# Patient Record
Sex: MALE | Race: WHITE | Employment: OTHER | URBAN - METROPOLITAN AREA
[De-identification: names, ages, dates, MRNs, and addresses within clinical notes are randomized per-mention and may not be internally consistent; named-entity substitution may affect disease eponyms.]

---

## 2017-01-05 ENCOUNTER — OFFICE VISIT (OUTPATIENT)
Dept: CARDIOLOGY CLINIC | Age: 62
End: 2017-01-05

## 2017-01-05 DIAGNOSIS — I48.0 PAROXYSMAL ATRIAL FIBRILLATION (HCC): Primary | Chronic | ICD-10-CM

## 2017-02-09 ENCOUNTER — OFFICE VISIT (OUTPATIENT)
Dept: CARDIOLOGY CLINIC | Age: 62
End: 2017-02-09

## 2017-02-09 DIAGNOSIS — I48.0 PAROXYSMAL ATRIAL FIBRILLATION (HCC): Primary | Chronic | ICD-10-CM

## 2017-03-07 ENCOUNTER — OFFICE VISIT (OUTPATIENT)
Dept: CARDIOLOGY CLINIC | Age: 62
End: 2017-03-07

## 2017-03-07 ENCOUNTER — CLINICAL SUPPORT (OUTPATIENT)
Dept: CARDIOLOGY CLINIC | Age: 62
End: 2017-03-07

## 2017-03-07 VITALS
HEIGHT: 75 IN | OXYGEN SATURATION: 97 % | HEART RATE: 53 BPM | WEIGHT: 228 LBS | DIASTOLIC BLOOD PRESSURE: 90 MMHG | BODY MASS INDEX: 28.35 KG/M2 | RESPIRATION RATE: 16 BRPM | SYSTOLIC BLOOD PRESSURE: 138 MMHG

## 2017-03-07 DIAGNOSIS — I48.0 PAROXYSMAL ATRIAL FIBRILLATION (HCC): Primary | Chronic | ICD-10-CM

## 2017-03-07 DIAGNOSIS — E78.2 MIXED HYPERLIPIDEMIA: ICD-10-CM

## 2017-03-07 DIAGNOSIS — Z86.79 S/P ABLATION OF ATRIAL FIBRILLATION: Primary | ICD-10-CM

## 2017-03-07 DIAGNOSIS — Z98.890 S/P ABLATION OF ATRIAL FIBRILLATION: Primary | ICD-10-CM

## 2017-03-07 DIAGNOSIS — I49.5 SSS (SICK SINUS SYNDROME) (HCC): ICD-10-CM

## 2017-03-07 DIAGNOSIS — I10 ESSENTIAL HYPERTENSION: ICD-10-CM

## 2017-03-07 DIAGNOSIS — I48.3 TYPICAL ATRIAL FLUTTER (HCC): Chronic | ICD-10-CM

## 2017-03-07 DIAGNOSIS — I48.92 ATRIAL FLUTTER WITH RAPID VENTRICULAR RESPONSE (HCC): ICD-10-CM

## 2017-03-07 DIAGNOSIS — Z45.09 ENCOUNTER FOR LOOP RECORDER CHECK: ICD-10-CM

## 2017-03-07 RX ORDER — ASPIRIN 81 MG/1
TABLET ORAL DAILY
COMMUNITY

## 2017-03-07 RX ORDER — FLECAINIDE ACETATE 50 MG/1
50 TABLET ORAL 2 TIMES DAILY
Qty: 60 TAB | Refills: 5 | Status: SHIPPED | OUTPATIENT
Start: 2017-03-07 | End: 2017-04-06

## 2017-03-07 NOTE — MR AVS SNAPSHOT
Visit Information Date & Time Provider Department Dept. Phone Encounter #  
 3/7/2017  1:15 PM Víctor Todd Dose, [address] [facility] [phone] [id number] Follow-up Instructions Return in about 6 months (around 9/7/2017). Routing History Follow-up and Disposition History Your Appointments 9/5/2017  1:00 PM  
6 MONTH with Drea Orourke MD  
[facility] [address] [place] [address]) Appt Note: Per Dr Tiffani Larkin $CP REM  
 [address]  
[phone] [address]  
  
    
  
 3/16/2017  8:00 AM  
REMOTE OFFICE VISIT with [facility] [address] [place] [address]) Appt Note: REMOTE MDT ILR- NOT A CLINIC APT  
 [address]  
[phone] [address] Upcoming Health Maintenance Date Due Hepatitis C Screening 1955 FOBT Q 1 YEAR AGE 50-75 1/4/2004 ZOSTER VACCINE AGE 60> 1/4/2014 INFLUENZA AGE 9 TO ADULT 8/1/2016 DTaP/Tdap/Td series (2 - Td) 7/2/2022 Allergies as of 3/7/2017  Review Complete On: 3/7/2017 By: Drea Orourke MD  
 No Known Allergies Current Immunizations  Never Reviewed Name Date Influenza Vaccine PF 1/3/2015  2:35 PM  
 TDAP Vaccine 7/2/2012 11:30 AM  
  
 Not reviewed this visit You Were Diagnosed With   
  
 Codes Comments Paroxysmal atrial fibrillation (HCC)    -  Primary ICD-10-CM: I48.0 ICD-9-CM: 427.31 Mixed hyperlipidemia     ICD-10-CM: E78.2 ICD-9-CM: 272.2 Typical atrial flutter (HCC)     ICD-10-CM: I48.3 ICD-9-CM: 427.32   
 SSS (sick sinus syndrome) (HCC)     ICD-10-CM: I49.5 ICD-9-CM: 427.81 Essential hypertension     ICD-10-CM: I10 
ICD-9-CM: 401.9 Vitals BP Pulse Resp Height(growth percentile) Weight(growth percentile) SpO2 138/90 (BP 1 Location: Left arm, BP Patient Position: Sitting) (!) 53 16 6' 3\" (1.905 m) 228 lb (103.4 kg) 97% BMI Smoking Status 28.5 kg/m2 Never Smoker BMI and BSA Data Body Mass Index Body Surface Area 28.5 kg/m 2 2.34 m 2 Preferred Pharmacy Pharmacy Name Phone [facility] Lisa Jones [phone] Your Updated Medication List  
  
   
This list is accurate as of: 3/7/17  1:48 PM.  Always use your most recent med list.  
  
  
  
  
 allopurinol 300 mg tablet Commonly known as:  Gaylan Elders Take 300 mg by mouth daily. aspirin delayed-release 81 mg tablet Take  by mouth daily. atenolol 25 mg tablet Commonly known as:  TENORMIN Take 0.5 Tabs by mouth daily. atorvastatin 20 mg tablet Commonly known as:  LIPITOR Take 20 mg by mouth daily. flecainide 50 mg tablet Commonly known as:  TAMBOCOR Take 1 Tab by mouth two (2) times a day for 30 days. hydroCHLOROthiazide 25 mg tablet Commonly known as:  HYDRODIURIL Take 1 tablet by mouth daily. LEVITRA 20 mg tablet Generic drug:  vardenafil Take 20 mg by mouth as needed. potassium chloride 20 mEq tablet Commonly known as:  K-DUR, KLOR-CON Take 20 mEq by mouth two (2) times a day. quinapril 40 mg tablet Commonly known as:  ACCUPRIL Take 40 mg by mouth daily. VIAGRA 100 mg tablet Generic drug:  sildenafil citrate Take 100 mg by mouth as needed. zolpidem 10 mg tablet Commonly known as:  AMBIEN Take 10 mg by mouth nightly as needed. Prescriptions Sent to Pharmacy Refills  
 flecainide (TAMBOCOR) 50 mg tablet 5 Sig: Take 1 Tab by mouth two (2) times a day for 30 days. Class: Normal  
 Pharmacy: [address] [place] [address] Ravinder Rodriguez 8 Ph #: [phone] Route: Oral  
  
We Performed the Following AMB POC EKG ROUTINE W/ 12 LEADS, INTER & REP [36429 CPT(R)] Follow-up Instructions Return in about 6 months (around 9/7/2017). Introducing [facility]! Dear Yeni Burrell: 
Thank you for requesting a 99Bill account. Our records indicate that you already have an active 99Bill account. You can access your account anytime at [email or web address] Did you know that you can access your hospital and ER discharge instructions at any time in 99Bill? You can also review all of your test results from your hospital stay or ER visit. Additional Information If you have questions, please visit the Frequently Asked Questions section of the 99Bill website at [email or web address] Remember, 99Bill is NOT to be used for urgent needs. For medical emergencies, dial 911. Now available from your iPhone and Android! Please provide this summary of care documentation to your next provider. Your primary care clinician is listed as JAMES Foster . If you have any questions after today's visit, please call [phone].

## 2017-03-07 NOTE — PROGRESS NOTES
Subjective:      Lenin Dove is a 60 y.o. male is here for follow up s/p AF ablation in 5/2016. The patient denies chest pain/ shortness of breath, orthopnea, PND, LE edema, palpitations, syncope, presyncope or fatigue. Patient Active Problem List    Diagnosis Date Noted    S/P ablation of atrial fibrillation 05/02/2016    Paroxysmal atrial fibrillation ([address] 04/19/2016    SSS (sick sinus syndrome) ([address] 03/15/2016    Thrombocytopenia ([address] 01/05/2015    Atrial flutter ([address] 01/02/2015    Pneumonia 01/02/2015    Hypotension 01/02/2015    Hypertension 01/02/2015    Hyperlipidemia 01/02/2015    Atrial flutter with rapid ventricular response ([address] 01/02/2015      JAMES Vásquez MD  Past Medical History:   Diagnosis Date    Arrhythmia     atrial flutter/atrial fibrillation    Arthritis     gout    Hypertension     Ill-defined condition     hypercholesterolemia    S/P ablation of atrial fibrillation 5/2/2016      Past Surgical History:   Procedure Laterality Date    HX GI  1997    anal fissure repair     No Known Allergies   Family History   Problem Relation Age of Onset    Other Mother      liver cirrhosis    Diabetes Father     Heart Disease Brother     negative for cardiac disease  Social History     Social History    Marital status:      Spouse name: N/A    Number of children: N/A    Years of education: N/A     Social History Main Topics    Smoking status: Never Smoker    Smokeless tobacco: Never Used    Alcohol use 0.5 oz/week     1 Cans of beer per week      Comment: every month    Drug use: No    Sexual activity: Yes     Other Topics Concern    Not on file     Social History Narrative     Current Outpatient Prescriptions   Medication Sig    sildenafil citrate (VIAGRA) 100 mg tablet Take 100 mg by mouth as needed.  flecainide (TAMBOCOR) 100 mg tablet Take 1 Tab by mouth two (2) times a day.     atenolol (TENORMIN) 25 mg tablet Take 0.5 Tabs by mouth daily.    LEVITRA 20 mg tablet Take 20 mg by mouth as needed.  quinapril (ACCUPRIL) 40 mg tablet Take 20 mg by mouth two (2) times a day.  zolpidem (AMBIEN) 10 mg tablet Take 10 mg by mouth nightly as needed.  hydrochlorothiazide (HYDRODIURIL) 25 mg tablet Take 1 tablet by mouth daily.  allopurinol (ZYLOPRIM) 300 mg tablet Take 300 mg by mouth daily.  potassium chloride (K-DUR, KLOR-CON) 20 mEq tablet Take 20 mEq by mouth two (2) times a day.  atorvastatin (LIPITOR) 20 mg tablet Take 20 mg by mouth daily. No current facility-administered medications for this visit. There were no vitals filed for this visit. I have reviewed the nurses notes, vitals, problem list, allergy list, medical history, family, social history and medications. Review of Symptoms:    General: Pt denies excessive weight gain or loss. Pt is able to conduct ADL's  HEENT: Denies blurred vision, headaches, epistaxis and difficulty swallowing. Respiratory: Denies shortness of breath, GALEANO, wheezing or stridor. Cardiovascular: Denies precordial pain, palpitations, edema or PND  Gastrointestinal: Denies poor appetite, indigestion, abdominal pain or blood in stool  Urinary: Denies dysuria, pyuria  Musculoskeletal: Denies pain or swelling from muscles or joints  Neurologic: Denies tremor, paresthesias, or sensory motor disturbance  Skin: Denies rash, itching or texture change. Psych: Denies depression      Physical Exam:      General: Well developed, in no acute distress. HEENT: Eyes - PERRL, no jvd  Heart:  Normal S1/S2 negative S3 or S4. Regular, no murmur, gallop or rub.   Respiratory: Clear bilaterally x 4, no wheezing or rales  Abdomen:   Soft, non-tender, bowel sounds are active.   Extremities:  No edema, normal cap refill, no cyanosis. Musculoskeletal: No clubbing  Neuro: A&Ox3, speech clear, gait stable. Skin: Skin color is normal. No rashes or lesions.  Non diaphoretic  Vascular: 2+ pulses symmetric in all extremities    Cardiographics    Ekg: s yuly, first degree av block, IVCD    Results for orders placed or performed during the hospital encounter of 05/13/16   EKG, 12 LEAD, INITIAL   Result Value Ref Range    Ventricular Rate 90 BPM    Atrial Rate 90 BPM    P-R Interval 232 ms    QRS Duration 120 ms    Q-T Interval 376 ms    QTC Calculation (Bezet) 459 ms    Calculated P Axis 43 degrees    Calculated R Axis -54 degrees    Calculated T Axis 27 degrees    Diagnosis       Sinus rhythm with 1st degree AV block  Left axis deviation  Septal infarct (cited on or before 02-JAN-2015)  Inferior infarct , age undetermined  When compared with ECG of 05-JAN-2015 03:09,  Vent. rate has increased BY  37 BPM  QRS axis shifted left  Inferior infarct is now present    Confirmed by Ivan Caruso ([id number]) on 5/13/2016 8:47:27 AM           Lab Results   Component Value Date/Time    WBC 8.3 05/03/2016 03:42 AM    HGB 14.6 05/03/2016 03:42 AM    HCT 42.7 05/03/2016 03:42 AM    PLATELET 226 50/77/3538 03:42 AM    MCV 91.6 05/03/2016 03:42 AM      Lab Results   Component Value Date/Time    Sodium 138 05/03/2016 06:29 AM    Potassium 3.2 05/03/2016 06:29 AM    Chloride 103 05/03/2016 06:29 AM    CO2 31 05/03/2016 06:29 AM    Anion gap 4 05/03/2016 06:29 AM    Glucose 103 05/03/2016 06:29 AM    BUN 16 05/03/2016 06:29 AM    Creatinine 0.98 05/03/2016 06:29 AM    BUN/Creatinine ratio 16 05/03/2016 06:29 AM    GFR est AA >60 05/03/2016 06:29 AM    GFR est non-AA >60 05/03/2016 06:29 AM    Calcium 8.4 05/03/2016 06:29 AM    Bilirubin, total 0.8 04/25/2016 10:10 AM    AST (SGOT) 27 04/25/2016 10:10 AM    Alk. phosphatase 74 04/25/2016 10:10 AM    Protein, total 7.0 04/25/2016 10:10 AM    Albumin 4.5 04/25/2016 10:10 AM    Globulin 3.7 01/02/2015 05:40 AM    A-G Ratio 1.8 04/25/2016 10:10 AM    ALT (SGPT) 32 04/25/2016 10:10 AM         Assessment:     Assessment:        ICD-10-CM ICD-9-CM    1.  Mixed hyperlipidemia E78.2 272.2 AMB POC EKG ROUTINE W/ 12 LEADS, INTER & REP     Orders Placed This Encounter    AMB POC EKG ROUTINE W/ 12 LEADS, INTER & REP     Order Specific Question:   Reason for Exam:     Answer:   Routine        Plan:   Mr Phillip Du is here today for f/u regarding afib s/p ablation in May 2016 with subsequent atrial flutter s/p [facility]. ILR shows bradycardia in the 40s. He is a CHADs vasc score of 1. We will decrease his flecainide to 50 mg bid and he will follow up in 6 months. Continue medical management for HTN and hyperlipidemia. Thank you for allowing me to participate in Brooklyn Landaverde 's care.     Fred Cooper MD, Priscila Sun

## 2017-03-16 ENCOUNTER — OFFICE VISIT (OUTPATIENT)
Dept: CARDIOLOGY CLINIC | Age: 62
End: 2017-03-16

## 2017-03-16 DIAGNOSIS — I48.0 PAROXYSMAL ATRIAL FIBRILLATION (HCC): Primary | Chronic | ICD-10-CM

## 2017-04-17 ENCOUNTER — OFFICE VISIT (OUTPATIENT)
Dept: CARDIOLOGY CLINIC | Age: 62
End: 2017-04-17

## 2017-04-17 DIAGNOSIS — I48.0 PAROXYSMAL ATRIAL FIBRILLATION (HCC): Primary | Chronic | ICD-10-CM

## 2017-05-18 ENCOUNTER — OFFICE VISIT (OUTPATIENT)
Dept: CARDIOLOGY CLINIC | Age: 62
End: 2017-05-18

## 2017-05-18 DIAGNOSIS — I48.0 PAROXYSMAL ATRIAL FIBRILLATION (HCC): Primary | Chronic | ICD-10-CM

## 2017-06-20 ENCOUNTER — OFFICE VISIT (OUTPATIENT)
Dept: CARDIOLOGY CLINIC | Age: 62
End: 2017-06-20

## 2017-06-20 DIAGNOSIS — I48.0 PAROXYSMAL ATRIAL FIBRILLATION (HCC): Primary | Chronic | ICD-10-CM

## 2017-08-25 ENCOUNTER — OFFICE VISIT (OUTPATIENT)
Dept: CARDIOLOGY CLINIC | Age: 62
End: 2017-08-25

## 2017-08-25 DIAGNOSIS — I48.0 PAROXYSMAL ATRIAL FIBRILLATION (HCC): Primary | Chronic | ICD-10-CM

## 2017-08-25 DIAGNOSIS — I45.5 SINUS PAUSE: ICD-10-CM

## 2017-09-05 ENCOUNTER — OFFICE VISIT (OUTPATIENT)
Dept: CARDIOLOGY CLINIC | Age: 62
End: 2017-09-05

## 2017-09-05 VITALS
OXYGEN SATURATION: 98 % | DIASTOLIC BLOOD PRESSURE: 70 MMHG | WEIGHT: 220.4 LBS | RESPIRATION RATE: 18 BRPM | HEART RATE: 52 BPM | SYSTOLIC BLOOD PRESSURE: 118 MMHG | HEIGHT: 75 IN | BODY MASS INDEX: 27.4 KG/M2

## 2017-09-05 DIAGNOSIS — E78.2 MIXED HYPERLIPIDEMIA: ICD-10-CM

## 2017-09-05 DIAGNOSIS — Z86.79 S/P ABLATION OF ATRIAL FIBRILLATION: ICD-10-CM

## 2017-09-05 DIAGNOSIS — R00.1 BRADYCARDIA: ICD-10-CM

## 2017-09-05 DIAGNOSIS — I10 ESSENTIAL HYPERTENSION: ICD-10-CM

## 2017-09-05 DIAGNOSIS — I48.3 TYPICAL ATRIAL FLUTTER (HCC): Chronic | ICD-10-CM

## 2017-09-05 DIAGNOSIS — Z98.890 S/P ABLATION OF ATRIAL FIBRILLATION: ICD-10-CM

## 2017-09-05 DIAGNOSIS — I49.5 SSS (SICK SINUS SYNDROME) (HCC): ICD-10-CM

## 2017-09-05 DIAGNOSIS — I48.0 PAROXYSMAL ATRIAL FIBRILLATION (HCC): Primary | Chronic | ICD-10-CM

## 2017-09-05 RX ORDER — FLECAINIDE ACETATE 50 MG/1
TABLET ORAL 2 TIMES DAILY
COMMUNITY
End: 2017-09-05 | Stop reason: SDUPTHER

## 2017-09-05 RX ORDER — FLECAINIDE ACETATE 50 MG/1
50 TABLET ORAL 2 TIMES DAILY
Qty: 180 TAB | Refills: 3 | Status: SHIPPED | OUTPATIENT
Start: 2017-09-05 | End: 2018-03-06

## 2017-09-05 NOTE — PROGRESS NOTES
Subjective:      Mary Harper is a 60 y.o. male is here for f/u regarding afib s/p ablation and atrial flutter s/p [address] The patient denies chest pain/ shortness of breath, orthopnea, PND, LE edema, palpitations, syncope, presyncope or fatigue. Patient Active Problem List    Diagnosis Date Noted    S/P ablation of atrial fibrillation 05/02/2016    Paroxysmal atrial fibrillation ([address] 04/19/2016    SSS (sick sinus syndrome) ([address] 03/15/2016    Thrombocytopenia ([address] 01/05/2015    Atrial flutter ([address] 01/02/2015    Pneumonia 01/02/2015    Hypotension 01/02/2015    Hypertension 01/02/2015    Hyperlipidemia 01/02/2015    Atrial flutter with rapid ventricular response [facility] 01/02/2015      JAMES oHran MD  Past Medical History:   Diagnosis Date    Arrhythmia     atrial flutter/atrial fibrillation    Arthritis     gout    Hypertension     Ill-defined condition     hypercholesterolemia    S/P ablation of atrial fibrillation 5/2/2016      Past Surgical History:   Procedure Laterality Date    HX GI  1997    anal fissure repair     No Known Allergies   Family History   Problem Relation Age of Onset    Other Mother      liver cirrhosis    Diabetes Father     Heart Disease Brother     negative for cardiac disease  Social History     Social History    Marital status:      Spouse name: N/A    Number of children: N/A    Years of education: N/A     Social History Main Topics    Smoking status: Never Smoker    Smokeless tobacco: Never Used    Alcohol use 0.5 oz/week     1 Cans of beer per week      Comment: every month    Drug use: No    Sexual activity: Yes     Other Topics Concern    None     Social History Narrative     Current Outpatient Prescriptions   Medication Sig    flecainide (TAMBOCOR) 50 mg tablet Take  by mouth two (2) times a day.  aspirin delayed-release 81 mg tablet Take  by mouth daily.     sildenafil citrate (VIAGRA) 100 mg tablet Take 100 mg by mouth as needed.  atenolol (TENORMIN) 25 mg tablet Take 0.5 Tabs by mouth daily.  LEVITRA 20 mg tablet Take 20 mg by mouth as needed.  quinapril (ACCUPRIL) 40 mg tablet Take 40 mg by mouth daily.  zolpidem (AMBIEN) 10 mg tablet Take 10 mg by mouth nightly as needed.  hydrochlorothiazide (HYDRODIURIL) 25 mg tablet Take 1 tablet by mouth daily.  allopurinol (ZYLOPRIM) 300 mg tablet Take 300 mg by mouth daily.  atorvastatin (LIPITOR) 20 mg tablet Take 20 mg by mouth daily.  potassium chloride (K-DUR, KLOR-CON) 20 mEq tablet Take 20 mEq by mouth two (2) times a day. No current facility-administered medications for this visit. Vitals:    09/05/17 1309   BP: 118/70   Pulse: (!) 52   Resp: 18   SpO2: 98%   Weight: 220 lb 6.4 oz (100 kg)   Height: 6' 3\" (1.905 m)       I have reviewed the nurses notes, vitals, problem list, allergy list, medical history, family, social history and medications. Review of Symptoms:    General: Pt denies excessive weight gain or loss. Pt is able to conduct ADL's  HEENT: Denies blurred vision, headaches, epistaxis and difficulty swallowing. Respiratory: Denies shortness of breath, GALEANO, wheezing or stridor. Cardiovascular: Denies precordial pain, palpitations, edema or PND  Gastrointestinal: Denies poor appetite, indigestion, abdominal pain or blood in stool  Urinary: Denies dysuria, pyuria  Musculoskeletal: Denies pain or swelling from muscles or joints  Neurologic: Denies tremor, paresthesias, or sensory motor disturbance  Skin: Denies rash, itching or texture change. Psych: Denies depression      Physical Exam:      General: Well developed, in no acute distress. HEENT: Eyes - PERRL, no jvd  Heart:  Normal S1/S2 negative S3 or S4. Regular, no murmur, gallop or rub.   Respiratory: Clear bilaterally x 4, no wheezing or rales  Abdomen:   Soft, non-tender, bowel sounds are active.   Extremities:  No edema, normal cap refill, no cyanosis.   Musculoskeletal: No clubbing  Neuro: A&Ox3, speech clear, gait stable. Skin: Skin color is normal. No rashes or lesions. Non diaphoretic  Vascular: 2+ pulses symmetric in all extremities    Cardiographics    Ekg: Sinus  Bradycardia  -First degree A-V block HR 51  -Nonspecific QRS widening. Old inferior infarct  -Old anterior infarct. Results for orders placed or performed during the hospital encounter of 05/13/16   EKG, 12 LEAD, INITIAL   Result Value Ref Range    Ventricular Rate 90 BPM    Atrial Rate 90 BPM    P-R Interval 232 ms    QRS Duration 120 ms    Q-T Interval 376 ms    QTC Calculation (Bezet) 459 ms    Calculated P Axis 43 degrees    Calculated R Axis -54 degrees    Calculated T Axis 27 degrees    Diagnosis       Sinus rhythm with 1st degree AV block  Left axis deviation  Septal infarct (cited on or before 02-JAN-2015)  Inferior infarct , age undetermined  When compared with ECG of 05-JAN-2015 03:09,  Vent. rate has increased BY  37 BPM  QRS axis shifted left  Inferior infarct is now present    Confirmed by Kit Gonzalez ([id number]) on 5/13/2016 8:47:27 AM           Lab Results   Component Value Date/Time    WBC 8.3 05/03/2016 03:42 AM    HGB 14.6 05/03/2016 03:42 AM    HCT 42.7 05/03/2016 03:42 AM    PLATELET 835 59/56/3826 03:42 AM    MCV 91.6 05/03/2016 03:42 AM      Lab Results   Component Value Date/Time    Sodium 138 05/03/2016 06:29 AM    Potassium 3.2 05/03/2016 06:29 AM    Chloride 103 05/03/2016 06:29 AM    CO2 31 05/03/2016 06:29 AM    Anion gap 4 05/03/2016 06:29 AM    Glucose 103 05/03/2016 06:29 AM    BUN 16 05/03/2016 06:29 AM    Creatinine 0.98 05/03/2016 06:29 AM    BUN/Creatinine ratio 16 05/03/2016 06:29 AM    GFR est AA >60 05/03/2016 06:29 AM    GFR est non-AA >60 05/03/2016 06:29 AM    Calcium 8.4 05/03/2016 06:29 AM    Bilirubin, total 0.8 04/25/2016 10:10 AM    AST (SGOT) 27 04/25/2016 10:10 AM    Alk.  phosphatase 74 04/25/2016 10:10 AM    Protein, total 7.0 04/25/2016 10:10 AM    Albumin 4.5 04/25/2016 10:10 AM    Globulin 3.7 01/02/2015 05:40 AM    A-G Ratio 1.8 04/25/2016 10:10 AM    ALT (SGPT) 32 04/25/2016 10:10 AM         Assessment:     Assessment:        ICD-10-CM ICD-9-CM    1. Paroxysmal atrial fibrillation (HCC) I48.0 427.31    2. S/P ablation of atrial fibrillation Z98.890 V45.89     Z86.79     3. Typical atrial flutter (HCC) I48.3 427.32    4. Bradycardia R00.1 427.89    5. Essential hypertension I10 401.9 AMB POC EKG ROUTINE W/ 12 LEADS, INTER & REP   6. Mixed hyperlipidemia E78.2 272.2      Orders Placed This Encounter    AMB POC EKG ROUTINE W/ 12 LEADS, INTER & REP     Order Specific Question:   Reason for Exam:     Answer:   routine    flecainide (TAMBOCOR) 50 mg tablet     Sig: Take  by mouth two (2) times a day. Plan:   Mr Rancho Wong is here today for f/u regarding afib s/p ablation and atrial flutter s/p [facility]. He has been having issues with bradycardia down to the 30s, reduction in Flecainide to 50mg in March. He denies cardiac complaints. EKG SB HR 51. His ILR shows bradycardia down to 39, 937 episodes. He is a candidate for a pacemaker with SSS. He declines at this time. Return for f/u in 6 months. Continue medical management for HTN and hyperlipidemia. Thank you for allowing me to participate in April Breeding 's care.     Armani Garcia MD, Kevin Leyva

## 2017-09-05 NOTE — PROGRESS NOTES
Chief Complaint   Patient presents with    Irregular Heart Beat     6 mo appt. Denied cardiac symptoms.

## 2017-09-05 NOTE — MR AVS SNAPSHOT
Visit Information Date & Time Provider Department Dept. Phone Encounter #  
 9/5/2017  1:00 PM Cheryn Jeans, [address] [place] [address] [facility] [phone] [id number] Your Appointments 3/6/2018  1:15 PM  
6 MONTH with Cheryn Jeans, MD  
[facility]) Appt Note: Dr. Maci Bright, [address] [place] [address]  
[phone] 932 [address] [address]  
  
    
 3/6/2018  1:15 PM  
PACEMAKER with PACEMAKER, [facility]) Appt Note: [address] [place] [address]  
[phone] [address]  
  
    
  
 9/25/2017  8:00 AM  
REMOTE OFFICE VISIT with [facility]) Appt Note: NOT A OFFICE VISIT  REMOTE MDT ILR  
 [phone] [facility]  
[phone] 2 [address] Upcoming Health Maintenance Date Due Hepatitis C Screening 1955 FOBT Q 1 YEAR AGE 50-75 1/4/2004 ZOSTER VACCINE AGE 60> 11/4/2013 INFLUENZA AGE 9 TO ADULT 8/1/2017 DTaP/Tdap/Td series (2 - Td) 7/2/2022 Allergies as of 9/5/2017  Review Complete On: 9/5/2017 By: Claudio Shultz NP No Known Allergies Current Immunizations  Never Reviewed Name Date Influenza Vaccine PF 1/3/2015  2:35 PM  
 TDAP Vaccine 7/2/2012 11:30 AM  
  
 Not reviewed this visit You Were Diagnosed With   
  
 Codes Comments Paroxysmal atrial fibrillation (HCC)    -  Primary ICD-10-CM: I48.0 ICD-9-CM: 427.31 S/P ablation of atrial fibrillation     ICD-10-CM: Z98.890, Z86.79 
ICD-9-CM: V45.89 Typical atrial flutter (HCC)     ICD-10-CM: I48.3 ICD-9-CM: 427.32   
 SSS (sick sinus syndrome) (HCC)     ICD-10-CM: I49.5 ICD-9-CM: 427.81 Bradycardia     ICD-10-CM: R00.1 ICD-9-CM: 427.89   
 Essential hypertension     ICD-10-CM: I10 
ICD-9-CM: 401.9 Mixed hyperlipidemia     ICD-10-CM: E78.2 ICD-9-CM: 272.2 Vitals BP Pulse Resp Height(growth percentile) Weight(growth percentile) SpO2  
 118/70 (BP 1 Location: Left arm, BP Patient Position: Sitting) (!) 52 18 6' 3\" (1.905 m) 220 lb 6.4 oz (100 kg) 98% BMI Smoking Status 27.55 kg/m2 Never Smoker Vitals History BMI and BSA Data Body Mass Index Body Surface Area  
 27.55 kg/m 2 2.3 m 2 Preferred Pharmacy Pharmacy Name Phone [id number] [address] [phone] Your Updated Medication List  
  
   
This list is accurate as of: 9/5/17  2:00 PM.  Always use your most recent med list.  
  
  
  
  
 allopurinol 300 mg tablet Commonly known as:  Bobbye Mole Take 300 mg by mouth daily. aspirin delayed-release 81 mg tablet Take  by mouth daily. atenolol 25 mg tablet Commonly known as:  TENORMIN Take 0.5 Tabs by mouth daily. atorvastatin 20 mg tablet Commonly known as:  LIPITOR Take 20 mg by mouth daily. flecainide 50 mg tablet Commonly known as:  TAMBOCOR Take 1 Tab by mouth two (2) times a day. hydroCHLOROthiazide 25 mg tablet Commonly known as:  HYDRODIURIL Take 1 tablet by mouth daily. LEVITRA 20 mg tablet Generic drug:  vardenafil Take 20 mg by mouth as needed. potassium chloride 20 mEq tablet Commonly known as:  K-DUR, KLOR-CON Take 20 mEq by mouth two (2) times a day. quinapril 40 mg tablet Commonly known as:  ACCUPRIL Take 40 mg by mouth daily. VIAGRA 100 mg tablet Generic drug:  sildenafil citrate Take 100 mg by mouth as needed. zolpidem 10 mg tablet Commonly known as:  AMBIEN Take 10 mg by mouth nightly as needed. Prescriptions Printed  Refills  
 flecainide (TAMBOCOR) 50 mg tablet 3  
 Sig: Take 1 Tab by mouth two (2) times a day. Class: Print Route: Oral  
  
We Performed the Following AMB POC EKG ROUTINE W/ 12 LEADS, INTER & REP [54443 CPT(R)] Introducing hospitals & [facility] SERVICES! Dear Andrew Walter: 
Thank you for requesting a Usersnap account. Our records indicate that you already have an active Usersnap account. You can access your account anytime at [email or web address] Did you know that you can access your hospital and ER discharge instructions at any time in Usersnap? You can also review all of your test results from your hospital stay or ER visit. Additional Information If you have questions, please visit the Frequently Asked Questions section of the Usersnap website at [email or web address] Remember, Usersnap is NOT to be used for urgent needs. For medical emergencies, dial 911. Now available from your iPhone and Android! Please provide this summary of care documentation to your next provider. Your primary care clinician is listed as JAMES Foster 47. If you have any questions after today's visit, please call [phone].

## 2017-09-25 ENCOUNTER — OFFICE VISIT (OUTPATIENT)
Dept: CARDIOLOGY CLINIC | Age: 62
End: 2017-09-25

## 2017-09-25 DIAGNOSIS — I48.0 PAROXYSMAL ATRIAL FIBRILLATION (HCC): Primary | Chronic | ICD-10-CM

## 2017-10-26 ENCOUNTER — OFFICE VISIT (OUTPATIENT)
Dept: CARDIOLOGY CLINIC | Age: 62
End: 2017-10-26

## 2017-10-26 DIAGNOSIS — I48.0 PAROXYSMAL ATRIAL FIBRILLATION (HCC): Primary | Chronic | ICD-10-CM

## 2017-11-27 ENCOUNTER — OFFICE VISIT (OUTPATIENT)
Dept: CARDIOLOGY CLINIC | Age: 62
End: 2017-11-27

## 2017-11-27 DIAGNOSIS — I48.0 PAROXYSMAL ATRIAL FIBRILLATION (HCC): Primary | Chronic | ICD-10-CM

## 2017-12-26 ENCOUNTER — OFFICE VISIT (OUTPATIENT)
Dept: CARDIOLOGY CLINIC | Age: 62
End: 2017-12-26

## 2017-12-26 VITALS
HEART RATE: 48 BPM | DIASTOLIC BLOOD PRESSURE: 78 MMHG | SYSTOLIC BLOOD PRESSURE: 126 MMHG | WEIGHT: 217.6 LBS | HEIGHT: 75 IN | BODY MASS INDEX: 27.06 KG/M2 | RESPIRATION RATE: 18 BRPM | OXYGEN SATURATION: 99 %

## 2017-12-26 DIAGNOSIS — I49.5 SSS (SICK SINUS SYNDROME) (HCC): ICD-10-CM

## 2017-12-26 DIAGNOSIS — Z98.890 S/P ABLATION OF ATRIAL FIBRILLATION: ICD-10-CM

## 2017-12-26 DIAGNOSIS — I48.0 PAROXYSMAL ATRIAL FIBRILLATION (HCC): Chronic | ICD-10-CM

## 2017-12-26 DIAGNOSIS — I10 ESSENTIAL HYPERTENSION: ICD-10-CM

## 2017-12-26 DIAGNOSIS — I49.9 IRREGULAR HEARTBEAT: Primary | ICD-10-CM

## 2017-12-26 DIAGNOSIS — Z86.79 S/P ABLATION OF ATRIAL FIBRILLATION: ICD-10-CM

## 2017-12-26 RX ORDER — METRONIDAZOLE 7.5 MG/G
CREAM TOPICAL
Status: ON HOLD | COMMUNITY
Start: 2017-12-06 | End: 2018-04-10

## 2017-12-26 NOTE — PROGRESS NOTES
Subjective:      Chivo Perkins is a 60 y.o. male is here for follow up of AF/AFL and bradycardia. He denies cardiac complaints. The patient denies chest pain/ shortness of breath, orthopnea, PND, LE edema, palpitations, syncope, presyncope or fatigue. Patient Active Problem List    Diagnosis Date Noted    Irregular heartbeat 12/26/2017    S/P ablation of atrial fibrillation 05/02/2016    Paroxysmal atrial fibrillation ([address] 04/19/2016    SSS (sick sinus syndrome) ([address] 03/15/2016    Thrombocytopenia ([address] 01/05/2015    Atrial flutter ([address] 01/02/2015    Pneumonia 01/02/2015    Hypotension 01/02/2015    Hypertension 01/02/2015    Hyperlipidemia 01/02/2015    Atrial flutter with rapid ventricular response ([address] 01/02/2015      JAMES Robison MD  Past Medical History:   Diagnosis Date    Arrhythmia     atrial flutter/atrial fibrillation    Arthritis     gout    Hypertension     Ill-defined condition     hypercholesterolemia    S/P ablation of atrial fibrillation 5/2/2016      Past Surgical History:   Procedure Laterality Date    HX GI  1997    anal fissure repair     No Known Allergies   Family History   Problem Relation Age of Onset    Other Mother      liver cirrhosis    Diabetes Father     Heart Disease Brother     negative for cardiac disease  Social History     Social History    Marital status:      Spouse name: N/A    Number of children: N/A    Years of education: N/A     Social History Main Topics    Smoking status: Never Smoker    Smokeless tobacco: Never Used    Alcohol use 0.5 oz/week     1 Cans of beer per week      Comment: every month    Drug use: No    Sexual activity: Yes     Other Topics Concern    None     Social History Narrative     Current Outpatient Prescriptions   Medication Sig    metroNIDAZOLE (METROCREAM) 0.75 % topical cream     flecainide (TAMBOCOR) 50 mg tablet Take 1 Tab by mouth two (2) times a day.     aspirin delayed-release 81 mg tablet Take  by mouth daily.  sildenafil citrate (VIAGRA) 100 mg tablet Take 100 mg by mouth as needed.  atenolol (TENORMIN) 25 mg tablet Take 0.5 Tabs by mouth daily.  LEVITRA 20 mg tablet Take 20 mg by mouth as needed.  quinapril (ACCUPRIL) 40 mg tablet Take 40 mg by mouth daily.  zolpidem (AMBIEN) 10 mg tablet Take 10 mg by mouth nightly as needed.  hydrochlorothiazide (HYDRODIURIL) 25 mg tablet Take 1 tablet by mouth daily.  allopurinol (ZYLOPRIM) 300 mg tablet Take 300 mg by mouth daily.  atorvastatin (LIPITOR) 20 mg tablet Take 20 mg by mouth daily.  potassium chloride (K-DUR, KLOR-CON) 20 mEq tablet Take 20 mEq by mouth two (2) times a day. No current facility-administered medications for this visit. Vitals:    12/26/17 0909   BP: 126/78   Pulse: (!) 48   Resp: 18   SpO2: 99%   Weight: 217 lb 9.6 oz (98.7 kg)   Height: 6' 3\" (1.905 m)       I have reviewed the nurses notes, vitals, problem list, allergy list, medical history, family, social history and medications. Review of Symptoms:    General: Pt denies excessive weight gain or loss. Pt is able to conduct ADL's  HEENT: Denies blurred vision, headaches, epistaxis and difficulty swallowing. Respiratory: Denies shortness of breath, GALEANO, wheezing or stridor. Cardiovascular: Denies precordial pain, palpitations, edema or PND  Gastrointestinal: Denies poor appetite, indigestion, abdominal pain or blood in stool  Urinary: Denies dysuria, pyuria  Musculoskeletal: Denies pain or swelling from muscles or joints  Neurologic: Denies tremor, paresthesias, or sensory motor disturbance  Skin: Denies rash, itching or texture change. Psych: Denies depression      Physical Exam:      General: Well developed, in no acute distress. HEENT: Eyes - PERRL, no jvd  Heart:  Normal S1/S2 negative S3 or S4.  Regular, no murmur, gallop or rub.   Respiratory: Clear bilaterally x 4, no wheezing or rales  Abdomen:   Soft, non-tender, bowel sounds are active.   Extremities:  No edema, normal cap refill, no cyanosis. Musculoskeletal: No clubbing  Neuro: A&Ox3, speech clear, gait stable. Skin: Skin color is normal. No rashes or lesions. Non diaphoretic  Vascular: 2+ pulses symmetric in all extremities    Cardiographics    Ekg: sinus bradycardia     Results for orders placed or performed during the hospital encounter of 05/13/16   EKG, 12 LEAD, INITIAL   Result Value Ref Range    Ventricular Rate 90 BPM    Atrial Rate 90 BPM    P-R Interval 232 ms    QRS Duration 120 ms    Q-T Interval 376 ms    QTC Calculation (Bezet) 459 ms    Calculated P Axis 43 degrees    Calculated R Axis -54 degrees    Calculated T Axis 27 degrees    Diagnosis       Sinus rhythm with 1st degree AV block  Left axis deviation  Septal infarct (cited on or before 02-JAN-2015)  Inferior infarct , age undetermined  When compared with ECG of 05-JAN-2015 03:09,  Vent. rate has increased BY  37 BPM  QRS axis shifted left  Inferior infarct is now present    Confirmed by Maddie Michaels ([id number]) on 5/13/2016 8:47:27 AM           Lab Results   Component Value Date/Time    WBC 8.3 05/03/2016 03:42 AM    HGB 14.6 05/03/2016 03:42 AM    HCT 42.7 05/03/2016 03:42 AM    PLATELET 405 67/94/6971 03:42 AM    MCV 91.6 05/03/2016 03:42 AM      Lab Results   Component Value Date/Time    Sodium 138 05/03/2016 06:29 AM    Potassium 3.2 05/03/2016 06:29 AM    Chloride 103 05/03/2016 06:29 AM    CO2 31 05/03/2016 06:29 AM    Anion gap 4 05/03/2016 06:29 AM    Glucose 103 05/03/2016 06:29 AM    BUN 16 05/03/2016 06:29 AM    Creatinine 0.98 05/03/2016 06:29 AM    BUN/Creatinine ratio 16 05/03/2016 06:29 AM    GFR est AA >60 05/03/2016 06:29 AM    GFR est non-AA >60 05/03/2016 06:29 AM    Calcium 8.4 05/03/2016 06:29 AM    Bilirubin, total 0.8 04/25/2016 10:10 AM    AST (SGOT) 27 04/25/2016 10:10 AM    Alk.  phosphatase 74 04/25/2016 10:10 AM    Protein, total 7.0 04/25/2016 10:10 AM    Albumin 4.5 04/25/2016 10:10 AM    Globulin 3.7 01/02/2015 05:40 AM    A-G Ratio 1.8 04/25/2016 10:10 AM    ALT (SGPT) 32 04/25/2016 10:10 AM         Assessment:     Assessment:        ICD-10-CM ICD-9-CM    1. Irregular heartbeat I49.9 427.9 AMB POC EKG ROUTINE W/ 12 LEADS, INTER & REP   2. SSS (sick sinus syndrome) (HCC) I49.5 427.81    3. Essential hypertension I10 401.9    4. Paroxysmal atrial fibrillation (HCC) I48.0 427.31    5. S/P ablation of atrial fibrillation Z98.890 V45.89     Z86.79       Orders Placed This Encounter    AMB POC EKG ROUTINE W/ 12 LEADS, INTER & REP     Order Specific Question:   Reason for Exam:     Answer:   routine    metroNIDAZOLE (METROCREAM) 0.75 % topical cream        Plan:   Mr. Wesley Magdaleno is here for follow up of AF s/p ablation and atrial flutter s/p [facility]. His ILR continues to shows bradycardia down to the 30s, despite reduction in Flecainide to 50mg in March. He denies cardiac complaints. EKG SB HR 48. His ILR shows 942 episodes of bradycardia. He is a candidate for a pacemaker with SSS but defers. Will dc his flecainide and have him return in 3 months to reassess his rhythm. Continue medical management for AF, SSS, HTN. Thank you for allowing me to participate in [facility] 's care.     ELVER Brandon MD, Judy Palm

## 2017-12-26 NOTE — MR AVS SNAPSHOT
Visit Information Date & Time Provider Department Dept. Phone Encounter #  
 12/26/2017  9:15 AM Helen Rasmussen, [address] [facility] [phone] [id number] Your Appointments 3/6/2018  1:15 PM  
6 MONTH with Jeremiah Hurst MD  
[facility] [address]) Appt Note: Dr. Annika Issa, [address] [facility] [address] [facility] [address]  
[phone] [address] [facility] [address] [address]  
  
    
 3/6/2018  1:15 PM  
PACEMAKER with PACEMAKER, [facility] [address]) Appt Note: [address] [facility] [address] [facility] [address]  
[phone] [address] [facility] [address]  
  
    
  
 12/28/2017  8:00 AM  
REMOTE OFFICE VISIT with [facility] [address]) Appt Note: NOT A OFFICE VISIT  REMOTE MDT ILR  
 [phone] [facility] [address]  
[phone] [address] [facility] [address] Upcoming Health Maintenance Date Due Hepatitis C Screening 1955 FOBT Q 1 YEAR AGE 50-75 1/4/2004 ZOSTER VACCINE AGE 60> 11/4/2013 Influenza Age 5 to Adult 8/1/2017 DTaP/Tdap/Td series (2 - Td) 7/2/2022 Allergies as of 12/26/2017  Review Complete On: 12/26/2017 By: Reinaldo Alfaro NP No Known Allergies Current Immunizations  Never Reviewed Name Date Influenza Vaccine PF 1/3/2015  2:35 PM  
 TDAP Vaccine 7/2/2012 11:30 AM  
  
 Not reviewed this visit You Were Diagnosed With   
  
 Codes Comments Irregular heartbeat    -  Primary ICD-10-CM: I49.9 ICD-9-CM: 427.9 SSS (sick sinus syndrome) (HCC)     ICD-10-CM: I49.5 ICD-9-CM: 427.81 Essential hypertension     ICD-10-CM: I10 
ICD-9-CM: 401.9 Paroxysmal atrial fibrillation (HCC)     ICD-10-CM: I48.0 ICD-9-CM: 427.31  S/P ablation of atrial fibrillation     ICD-10-CM: Z98.890, Z86.79 
ICD-9-CM: V45.89   
  
 Vitals BP Pulse Resp Height(growth percentile) Weight(growth percentile) SpO2  
 126/78 (BP 1 Location: Right arm, BP Patient Position: Sitting) (!) 48 18 6' 3\" (1.905 m) 217 lb 9.6 oz (98.7 kg) 99% BMI Smoking Status 27.2 kg/m2 Never Smoker Vitals History BMI and BSA Data Body Mass Index Body Surface Area  
 27.2 kg/m 2 2.29 m 2 Preferred Pharmacy Pharmacy Name Phone [id number] [address] [facility] [address] [phone] Your Updated Medication List  
  
   
This list is accurate as of: 12/26/17  9:42 AM.  Always use your most recent med list.  
  
  
  
  
 allopurinol 300 mg tablet Commonly known as:  Liliana Dakins Take 300 mg by mouth daily. aspirin delayed-release 81 mg tablet Take  by mouth daily. atenolol 25 mg tablet Commonly known as:  TENORMIN Take 0.5 Tabs by mouth daily. atorvastatin 20 mg tablet Commonly known as:  LIPITOR Take 20 mg by mouth daily. flecainide 50 mg tablet Commonly known as:  TAMBOCOR Take 1 Tab by mouth two (2) times a day. hydroCHLOROthiazide 25 mg tablet Commonly known as:  HYDRODIURIL Take 1 tablet by mouth daily. LEVITRA 20 mg tablet Generic drug:  vardenafil Take 20 mg by mouth as needed. metroNIDAZOLE 0.75 % topical cream  
Commonly known as:  METROCREAM  
  
 potassium chloride 20 mEq tablet Commonly known as:  K-DUR, KLOR-CON Take 20 mEq by mouth two (2) times a day. quinapril 40 mg tablet Commonly known as:  ACCUPRIL Take 40 mg by mouth daily. VIAGRA 100 mg tablet Generic drug:  sildenafil citrate Take 100 mg by mouth as needed. zolpidem 10 mg tablet Commonly known as:  AMBIEN Take 10 mg by mouth nightly as needed. We Performed the Following AMB POC EKG ROUTINE W/ 12 LEADS, INTER & REP [71730 CPT(R)] Introducing hospitals & HEALTH SERVICES! Dear Javier Cosby: Thank you for requesting a [employer] account. Our records indicate that you already have an active [employer] account. You can access your account anytime at [email or web address] Did you know that you can access your hospital and ER discharge instructions at any time in [employer]? You can also review all of your test results from your hospital stay or ER visit. Additional Information If you have questions, please visit the Frequently Asked Questions section of the [employer] website at [email or web address] Remember, [employer] is NOT to be used for urgent needs. For medical emergencies, dial 911. Now available from your iPhone and Android! Please provide this summary of care documentation to your next provider. Your primary care clinician is listed as JAMES Marie. If you have any questions after today's visit, please call [phone].

## 2017-12-26 NOTE — PROGRESS NOTES
1. Have you been to the ER, urgent care clinic since your last visit? Hospitalized since your last visit? No    2. Have you seen or consulted any other health care providers outside of the [address] [place] [address] since your last visit? Include any pap smears or colon screening. No    Chief Complaint   Patient presents with    Irregular Heart Beat     Discuss ILR report. Denied cardiac symptoms.

## 2017-12-28 ENCOUNTER — OFFICE VISIT (OUTPATIENT)
Dept: CARDIOLOGY CLINIC | Age: 62
End: 2017-12-28

## 2017-12-28 DIAGNOSIS — I48.3 TYPICAL ATRIAL FLUTTER (HCC): Primary | Chronic | ICD-10-CM

## 2018-01-29 ENCOUNTER — OFFICE VISIT (OUTPATIENT)
Dept: CARDIOLOGY CLINIC | Age: 63
End: 2018-01-29

## 2018-01-29 DIAGNOSIS — I48.3 TYPICAL ATRIAL FLUTTER (HCC): Primary | Chronic | ICD-10-CM

## 2018-03-01 ENCOUNTER — CLINICAL SUPPORT (OUTPATIENT)
Dept: CARDIOLOGY CLINIC | Age: 63
End: 2018-03-01

## 2018-03-01 DIAGNOSIS — I48.0 PAROXYSMAL ATRIAL FIBRILLATION (HCC): Primary | Chronic | ICD-10-CM

## 2018-03-06 ENCOUNTER — CLINICAL SUPPORT (OUTPATIENT)
Dept: CARDIOLOGY CLINIC | Age: 63
End: 2018-03-06

## 2018-03-06 ENCOUNTER — OFFICE VISIT (OUTPATIENT)
Dept: CARDIOLOGY CLINIC | Age: 63
End: 2018-03-06

## 2018-03-06 VITALS
HEART RATE: 48 BPM | HEIGHT: 75 IN | WEIGHT: 219 LBS | SYSTOLIC BLOOD PRESSURE: 112 MMHG | RESPIRATION RATE: 18 BRPM | BODY MASS INDEX: 27.23 KG/M2 | OXYGEN SATURATION: 98 % | DIASTOLIC BLOOD PRESSURE: 76 MMHG

## 2018-03-06 DIAGNOSIS — I10 ESSENTIAL HYPERTENSION: ICD-10-CM

## 2018-03-06 DIAGNOSIS — I48.92 ATRIAL FLUTTER WITH RAPID VENTRICULAR RESPONSE (HCC): Primary | ICD-10-CM

## 2018-03-06 DIAGNOSIS — I48.3 TYPICAL ATRIAL FLUTTER (HCC): Chronic | ICD-10-CM

## 2018-03-06 DIAGNOSIS — I49.5 SSS (SICK SINUS SYNDROME) (HCC): Primary | ICD-10-CM

## 2018-03-06 DIAGNOSIS — I48.0 PAROXYSMAL ATRIAL FIBRILLATION (HCC): Chronic | ICD-10-CM

## 2018-03-06 DIAGNOSIS — I49.9 IRREGULAR HEARTBEAT: ICD-10-CM

## 2018-03-06 NOTE — PROGRESS NOTES
Subjective:      Marybel Wanger is a 58 y.o. male is here for follow up of AF s/p ablation. He is feeling well. The patient denies chest pain/ shortness of breath, orthopnea, PND, LE edema, palpitations, syncope, presyncope or fatigue. Patient Active Problem List    Diagnosis Date Noted    Irregular heartbeat 12/26/2017    S/P ablation of atrial fibrillation 05/02/2016    Paroxysmal atrial fibrillation ([address] 04/19/2016    SSS (sick sinus syndrome) ([address] 03/15/2016    Thrombocytopenia ([address] 01/05/2015    Atrial flutter ([address] 01/02/2015    Pneumonia 01/02/2015    Hypotension 01/02/2015    Hypertension 01/02/2015    Hyperlipidemia 01/02/2015    Atrial flutter with rapid ventricular response ([address] 01/02/2015      JAMES Segal MD  Past Medical History:   Diagnosis Date    Arrhythmia     atrial flutter/atrial fibrillation    Arthritis     gout    Hypertension     Ill-defined condition     hypercholesterolemia    S/P ablation of atrial fibrillation 5/2/2016      Past Surgical History:   Procedure Laterality Date    HX GI  1997    anal fissure repair     No Known Allergies   Family History   Problem Relation Age of Onset    Other Mother      liver cirrhosis    Diabetes Father     Heart Disease Brother     negative for cardiac disease  Social History     Social History    Marital status:      Spouse name: N/A    Number of children: N/A    Years of education: N/A     Social History Main Topics    Smoking status: Never Smoker    Smokeless tobacco: Never Used    Alcohol use 0.5 oz/week     1 Cans of beer per week      Comment: every month    Drug use: No    Sexual activity: Yes     Other Topics Concern    None     Social History Narrative     Current Outpatient Prescriptions   Medication Sig    metroNIDAZOLE (METROCREAM) 0.75 % topical cream     aspirin delayed-release 81 mg tablet Take  by mouth daily.     sildenafil citrate (VIAGRA) 100 mg tablet Take 100 mg by mouth as needed.  atenolol (TENORMIN) 25 mg tablet Take 0.5 Tabs by mouth daily.  LEVITRA 20 mg tablet Take 20 mg by mouth as needed.  quinapril (ACCUPRIL) 40 mg tablet Take 40 mg by mouth daily.  zolpidem (AMBIEN) 10 mg tablet Take 10 mg by mouth nightly as needed.  hydrochlorothiazide (HYDRODIURIL) 25 mg tablet Take 1 tablet by mouth daily.  allopurinol (ZYLOPRIM) 300 mg tablet Take 300 mg by mouth daily.  atorvastatin (LIPITOR) 20 mg tablet Take 20 mg by mouth daily.  potassium chloride (K-DUR, KLOR-CON) 20 mEq tablet Take 20 mEq by mouth two (2) times a day. No current facility-administered medications for this visit. Vitals:    03/06/18 1310   BP: 112/76   Pulse: (!) 48   Resp: 18   SpO2: 98%   Weight: 219 lb (99.3 kg)   Height: 6' 3\" (1.905 m)       I have reviewed the nurses notes, vitals, problem list, allergy list, medical history, family, social history and medications. Review of Symptoms:    General: Pt denies excessive weight gain or loss. Pt is able to conduct ADL's  HEENT: Denies blurred vision, headaches, epistaxis and difficulty swallowing. Respiratory: Denies shortness of breath, GALEANO, wheezing or stridor. Cardiovascular: Denies precordial pain, palpitations, edema or PND  Gastrointestinal: Denies poor appetite, indigestion, abdominal pain or blood in stool  Urinary: Denies dysuria, pyuria  Musculoskeletal: Denies pain or swelling from muscles or joints  Neurologic: Denies tremor, paresthesias, or sensory motor disturbance  Skin: Denies rash, itching or texture change. Psych: Denies depression      Physical Exam:      General: Well developed, in no acute distress. HEENT: Eyes - PERRL, no jvd  Heart:  Normal S1/S2 negative S3 or S4. Regular, no murmur, gallop or rub.   Respiratory: Clear bilaterally x 4, no wheezing or rales  Abdomen:   Soft, non-tender, bowel sounds are active.   Extremities:  No edema, normal cap refill, no cyanosis.   Musculoskeletal: No clubbing  Neuro: A&Ox3, speech clear, gait stable. Skin: Skin color is normal. No rashes or lesions. Non diaphoretic  Vascular: 2+ pulses symmetric in all extremities    Cardiographics    Ekg: marked sinus bradycardia  ILR: bradycardia in the 30s, 3-5 second pauses    Results for orders placed or performed during the hospital encounter of 05/13/16   EKG, 12 LEAD, INITIAL   Result Value Ref Range    Ventricular Rate 90 BPM    Atrial Rate 90 BPM    P-R Interval 232 ms    QRS Duration 120 ms    Q-T Interval 376 ms    QTC Calculation (Bezet) 459 ms    Calculated P Axis 43 degrees    Calculated R Axis -54 degrees    Calculated T Axis 27 degrees    Diagnosis       Sinus rhythm with 1st degree AV block  Left axis deviation  Septal infarct (cited on or before 02-JAN-2015)  Inferior infarct , age undetermined  When compared with ECG of 05-JAN-2015 03:09,  Vent. rate has increased BY  37 BPM  QRS axis shifted left  Inferior infarct is now present    Confirmed by Ali Mode ([id number]) on 5/13/2016 8:47:27 AM           Lab Results   Component Value Date/Time    WBC 8.3 05/03/2016 03:42 AM    HGB 14.6 05/03/2016 03:42 AM    HCT 42.7 05/03/2016 03:42 AM    PLATELET 575 (L) 06/26/5774 03:42 AM    MCV 91.6 05/03/2016 03:42 AM      Lab Results   Component Value Date/Time    Sodium 138 05/03/2016 06:29 AM    Potassium 3.2 (L) 05/03/2016 06:29 AM    Chloride 103 05/03/2016 06:29 AM    CO2 31 05/03/2016 06:29 AM    Anion gap 4 (L) 05/03/2016 06:29 AM    Glucose 103 (H) 05/03/2016 06:29 AM    BUN 16 05/03/2016 06:29 AM    Creatinine 0.98 05/03/2016 06:29 AM    BUN/Creatinine ratio 16 05/03/2016 06:29 AM    GFR est AA >60 05/03/2016 06:29 AM    GFR est non-AA >60 05/03/2016 06:29 AM    Calcium 8.4 (L) 05/03/2016 06:29 AM    Bilirubin, total 0.8 04/25/2016 10:10 AM    AST (SGOT) 27 04/25/2016 10:10 AM    Alk.  phosphatase 74 04/25/2016 10:10 AM    Protein, total 7.0 04/25/2016 10:10 AM    Albumin 4.5 04/25/2016 10:10 AM    Globulin 3.7 01/02/2015 05:40 AM    A-G Ratio 1.8 04/25/2016 10:10 AM    ALT (SGPT) 32 04/25/2016 10:10 AM         Assessment:     Assessment:        ICD-10-CM ICD-9-CM    1. Irregular heartbeat I49.9 427.9 AMB POC EKG ROUTINE W/ 12 LEADS, INTER & REP   2. Typical atrial flutter (HCC) I48.3 427.32    3. Paroxysmal atrial fibrillation (HCC) I48.0 427.31    4. SSS (sick sinus syndrome) (HCC) I49.5 427.81    5. Essential hypertension I10 401.9      Orders Placed This Encounter    AMB POC EKG ROUTINE W/ 12 LEADS, INTER & REP     Order Specific Question:   Reason for Exam:     Answer:   routine        Plan:   Mr. Jadon Davidson is here for follow up of AF s/p AF/AFL ablation s/p [facility]. He denies cardiac complaints. His EKG shows marked sinus bradycardia and ILR demonstrates sinus bradycardia with 3-5 second pauses. He remains on atenolol. He is a candidate for an ILR removal and implant of pacemaker. I discussed the risks/benefits/alternatives of the procedure with the patient. Risks include (but are not limited to) bleeding, infection, cva/mi/tamponade/death. The patient understands and agrees to proceed. Thank you for this interesting consultation. We will schedule. Continue medical management for HTN, AF/AFL, SSS. Thank you for allowing me to participate in [facility] 's care.     ELVER Swift MD, Carleen Lauren

## 2018-03-06 NOTE — MR AVS SNAPSHOT
[address] [facility] 
[phone] Patient: Beni Contreras MRN: [id number] OXE:0/7/1287 Visit Information Date & Time Provider Department Dept. Phone Encounter #  
 3/6/2018  1:15 PM Víctor Arnold, [address] [facility] [phone] [id number] Follow-up Instructions Return in about 4 weeks (around 4/3/2018). Routing History Follow-up and Disposition History Your Appointments 4/26/2018  2:00 PM  
ESTABLISHED PATIENT with Meggan Rey NP [facility] Richie Gambino) Appt Note: Noel Doan f/u new dcpm,from 4/12,a  
 [address] [facility]  
[phone] [address] [facility] [address] [address]  
  
    
 4/26/2018  2:00 PM  
PROCEDURE with PACEMAKER, [address] Richie Gambino) Appt Note: Noel Doan f/u new dcpm,from 4/12,jaa  
 [address] [facility]  
[phone] [address] [facility] Upcoming Health Maintenance Date Due Hepatitis C Screening 1955 FOBT Q 1 YEAR AGE 50-75 1/4/2004 ZOSTER VACCINE AGE 60> 11/4/2013 Influenza Age 5 to Adult 8/1/2017 DTaP/Tdap/Td series (2 - Td) 7/2/2022 Allergies as of 3/6/2018  Review Complete On: 3/6/2018 By: Chica Liu MD  
 No Known Allergies Current Immunizations  Never Reviewed Name Date Influenza Vaccine PF 1/3/2015  2:35 PM  
 TDAP Vaccine 7/2/2012 11:30 AM  
  
 Not reviewed this visit You Were Diagnosed With   
  
 Codes Comments SSS (sick sinus syndrome) (HCC)    -  Primary ICD-10-CM: I49.5 ICD-9-CM: 427.81 Irregular heartbeat     ICD-10-CM: I49.9 ICD-9-CM: 427.9 Typical atrial flutter (HCC)     ICD-10-CM: I48.3 ICD-9-CM: 427.32 Paroxysmal atrial fibrillation (HCC)     ICD-10-CM: I48.0 ICD-9-CM: 427.31   
 Essential hypertension     ICD-10-CM: I10 
ICD-9-CM: 401.9 Vitals BP Pulse Resp Height(growth percentile) Weight(growth percentile) SpO2  
 112/76 (BP 1 Location: Right arm, BP Patient Position: Sitting) (!) 48 18 6' 3\" (1.905 m) 219 lb (99.3 kg) 98% BMI Smoking Status 27.37 kg/m2 Never Smoker Vitals History BMI and BSA Data Body Mass Index Body Surface Area  
 27.37 kg/m 2 2.29 m 2 Preferred Pharmacy Pharmacy Name Phone [facility][id number] [address] [phone] Your Updated Medication List  
  
   
This list is accurate as of 3/6/18  2:10 PM.  Always use your most recent med list.  
  
  
  
  
 allopurinol 300 mg tablet Commonly known as:  Paddy Meghna Take 300 mg by mouth daily. aspirin delayed-release 81 mg tablet Take  by mouth daily. atenolol 25 mg tablet Commonly known as:  TENORMIN Take 0.5 Tabs by mouth daily. atorvastatin 20 mg tablet Commonly known as:  LIPITOR Take 20 mg by mouth daily. hydroCHLOROthiazide 25 mg tablet Commonly known as:  HYDRODIURIL Take 1 tablet by mouth daily. LEVITRA 20 mg tablet Generic drug:  vardenafil Take 20 mg by mouth as needed. metroNIDAZOLE 0.75 % topical cream  
Commonly known as:  METROCREAM  
  
 potassium chloride 20 mEq tablet Commonly known as:  K-DUR, KLOR-CON Take 20 mEq by mouth two (2) times a day. quinapril 40 mg tablet Commonly known as:  ACCUPRIL Take 40 mg by mouth daily. VIAGRA 100 mg tablet Generic drug:  sildenafil citrate Take 100 mg by mouth as needed. zolpidem 10 mg tablet Commonly known as:  AMBIEN Take 10 mg by mouth nightly as needed. We Performed the Following AMB POC EKG ROUTINE W/ 12 LEADS, INTER & REP [01357 CPT(R)] CBC WITH AUTOMATED DIFF [26776 CPT(R)] METABOLIC PANEL, COMPREHENSIVE [07631 CPT(R)] PROTHROMBIN TIME + INR [09287 CPT(R)] Follow-up Instructions Return in about 4 weeks (around 4/3/2018). To-Do List   
 Around 03/06/2018 Imaging:  XR CHEST PA LAT Introducing [facility] SERVICES! Dear Collin Jasso: 
Thank you for requesting a [employer] account. Our records indicate that you already have an active [employer] account. You can access your account anytime at [email or web address]. [email or web address] Did you know that you can access your hospital and ER discharge instructions at any time in [employer]? You can also review all of your test results from your hospital stay or ER visit. Additional Information If you have questions, please visit the Frequently Asked Questions section of the [employer] website at [email or web address] Remember, [employer] is NOT to be used for urgent needs. For medical emergencies, dial 911. Now available from your iPhone and Android! Please provide this summary of care documentation to your next provider. Your primary care clinician is listed as JAMES Foster 47. If you have any questions after today's visit, please call [phone].

## 2018-03-06 NOTE — PROGRESS NOTES
1. Have you been to the ER, urgent care clinic since your last visit? Hospitalized since your last visit? No    2. Have you seen or consulted any other health care providers outside of the [address] [place] [address] since your last visit? Include any pap smears or colon screening. No    Chief Complaint   Patient presents with    Irregular Heart Beat     2 mo appt. Off Flecinide. Denied cardiac symptoms.

## 2018-04-02 ENCOUNTER — HOSPITAL ENCOUNTER (OUTPATIENT)
Dept: GENERAL RADIOLOGY | Age: 63
Discharge: HOME | End: 2018-04-02
Payer: COMMERCIAL

## 2018-04-02 DIAGNOSIS — I49.5 SSS (SICK SINUS SYNDROME) (HCC): ICD-10-CM

## 2018-04-02 PROCEDURE — 71046 X-RAY EXAM CHEST 2 VIEWS: CPT

## 2018-04-03 LAB
ALBUMIN SERPL-MCNC: 4.3 G/DL (ref 3.6–4.8)
ALBUMIN/GLOB SERPL: 1.9 {RATIO} (ref 1.2–2.2)
ALP SERPL-CCNC: 66 IU/L (ref 39–117)
ALT SERPL-CCNC: 21 IU/L (ref 0–44)
AST SERPL-CCNC: 21 IU/L (ref 0–40)
BASOPHILS # BLD AUTO: 0.1 X10E3/UL (ref 0–0.2)
BASOPHILS NFR BLD AUTO: 1 %
BILIRUB SERPL-MCNC: 0.6 MG/DL (ref 0–1.2)
BUN SERPL-MCNC: 26 MG/DL (ref 8–27)
BUN/CREAT SERPL: 27 (ref 10–24)
CALCIUM SERPL-MCNC: 9.6 MG/DL (ref 8.6–10.2)
CHLORIDE SERPL-SCNC: 99 MMOL/L (ref 96–106)
CO2 SERPL-SCNC: 28 MMOL/L (ref 18–29)
CREAT SERPL-MCNC: 0.98 MG/DL (ref 0.76–1.27)
EOSINOPHIL # BLD AUTO: 0.2 X10E3/UL (ref 0–0.4)
EOSINOPHIL NFR BLD AUTO: 4 %
ERYTHROCYTE [DISTWIDTH] IN BLOOD BY AUTOMATED COUNT: 13.7 % (ref 12.3–15.4)
GFR SERPLBLD CREATININE-BSD FMLA CKD-EPI: 81 ML/MIN/1.73
GFR SERPLBLD CREATININE-BSD FMLA CKD-EPI: 94 ML/MIN/1.73
GLOBULIN SER CALC-MCNC: 2.3 G/DL (ref 1.5–4.5)
GLUCOSE SERPL-MCNC: 109 MG/DL (ref 65–99)
HCT VFR BLD AUTO: 44.5 % (ref 37.5–51)
HGB BLD-MCNC: 15.4 G/DL (ref 13–17.7)
IMM GRANULOCYTES # BLD: 0 X10E3/UL (ref 0–0.1)
IMM GRANULOCYTES NFR BLD: 0 %
INR PPP: 1 (ref 0.8–1.2)
LYMPHOCYTES # BLD AUTO: 1.7 X10E3/UL (ref 0.7–3.1)
LYMPHOCYTES NFR BLD AUTO: 36 %
MCH RBC QN AUTO: 31.9 PG (ref 26.6–33)
MCHC RBC AUTO-ENTMCNC: 34.6 G/DL (ref 31.5–35.7)
MCV RBC AUTO: 92 FL (ref 79–97)
MONOCYTES # BLD AUTO: 0.3 X10E3/UL (ref 0.1–0.9)
MONOCYTES NFR BLD AUTO: 6 %
NEUTROPHILS # BLD AUTO: 2.5 X10E3/UL (ref 1.4–7)
NEUTROPHILS NFR BLD AUTO: 53 %
PLATELET # BLD AUTO: 153 X10E3/UL (ref 150–379)
POTASSIUM SERPL-SCNC: 4.4 MMOL/L (ref 3.5–5.2)
PROT SERPL-MCNC: 6.6 G/DL (ref 6–8.5)
PROTHROMBIN TIME: 10.5 SEC (ref 9.1–12)
RBC # BLD AUTO: 4.83 X10E6/UL (ref 4.14–5.8)
SODIUM SERPL-SCNC: 142 MMOL/L (ref 134–144)
WBC # BLD AUTO: 4.8 X10E3/UL (ref 3.4–10.8)

## 2018-04-10 ENCOUNTER — APPOINTMENT (OUTPATIENT)
Dept: GENERAL RADIOLOGY | Age: 63
End: 2018-04-10
Attending: INTERNAL MEDICINE
Payer: COMMERCIAL

## 2018-04-10 ENCOUNTER — HOSPITAL ENCOUNTER (OUTPATIENT)
Dept: NON INVASIVE DIAGNOSTICS | Age: 63
Discharge: HOME | End: 2018-04-10
Attending: INTERNAL MEDICINE | Admitting: INTERNAL MEDICINE
Payer: COMMERCIAL

## 2018-04-10 VITALS
HEIGHT: 75 IN | RESPIRATION RATE: 18 BRPM | DIASTOLIC BLOOD PRESSURE: 94 MMHG | BODY MASS INDEX: 27.35 KG/M2 | SYSTOLIC BLOOD PRESSURE: 157 MMHG | TEMPERATURE: 97.7 F | WEIGHT: 220 LBS | OXYGEN SATURATION: 100 % | HEART RATE: 60 BPM

## 2018-04-10 PROCEDURE — 74011000250 HC RX REV CODE- 250

## 2018-04-10 PROCEDURE — 77010033678 HC OXYGEN DAILY

## 2018-04-10 PROCEDURE — C1892 INTRO/SHEATH,FIXED,PEEL-AWAY: HCPCS

## 2018-04-10 PROCEDURE — 77030031139 HC SUT VCRL2 J&J -A

## 2018-04-10 PROCEDURE — 77030018729 HC ELECTRD DEFIB PAD CARD -B

## 2018-04-10 PROCEDURE — A4565 SLINGS: HCPCS

## 2018-04-10 PROCEDURE — 74011250637 HC RX REV CODE- 250/637

## 2018-04-10 PROCEDURE — C1894 INTRO/SHEATH, NON-LASER: HCPCS

## 2018-04-10 PROCEDURE — 71045 X-RAY EXAM CHEST 1 VIEW: CPT

## 2018-04-10 PROCEDURE — C1785 PMKR, DUAL, RATE-RESP: HCPCS

## 2018-04-10 PROCEDURE — 33208 INSRT HEART PM ATRIAL & VENT: CPT

## 2018-04-10 PROCEDURE — 77030011640 HC PAD GRND REM COVD -A

## 2018-04-10 PROCEDURE — 77030002996 HC SUT SLK J&J -A

## 2018-04-10 PROCEDURE — 74011250636 HC RX REV CODE- 250/636

## 2018-04-10 PROCEDURE — 77030018836 HC SOL IRR NACL ICUM -A

## 2018-04-10 PROCEDURE — C1898 LEAD, PMKR, OTHER THAN TRANS: HCPCS

## 2018-04-10 RX ORDER — LIDOCAINE HYDROCHLORIDE 10 MG/ML
INJECTION INFILTRATION; PERINEURAL
Status: COMPLETED
Start: 2018-04-10 | End: 2018-04-10

## 2018-04-10 RX ORDER — MIDAZOLAM HYDROCHLORIDE 1 MG/ML
1-5 INJECTION, SOLUTION INTRAMUSCULAR; INTRAVENOUS
Status: DISCONTINUED | OUTPATIENT
Start: 2018-04-10 | End: 2018-04-10 | Stop reason: HOSPADM

## 2018-04-10 RX ORDER — SODIUM CHLORIDE 0.9 % (FLUSH) 0.9 %
5-10 SYRINGE (ML) INJECTION EVERY 8 HOURS
Status: DISCONTINUED | OUTPATIENT
Start: 2018-04-10 | End: 2018-04-10 | Stop reason: HOSPADM

## 2018-04-10 RX ORDER — CEFAZOLIN SODIUM/WATER 2 G/20 ML
SYRINGE (ML) INTRAVENOUS
Status: COMPLETED
Start: 2018-04-10 | End: 2018-04-10

## 2018-04-10 RX ORDER — LIDOCAINE HYDROCHLORIDE 10 MG/ML
1-40 INJECTION INFILTRATION; PERINEURAL
Status: DISCONTINUED | OUTPATIENT
Start: 2018-04-10 | End: 2018-04-10 | Stop reason: HOSPADM

## 2018-04-10 RX ORDER — BACITRACIN 50000 [IU]/1
INJECTION, POWDER, FOR SOLUTION INTRAMUSCULAR
Status: COMPLETED
Start: 2018-04-10 | End: 2018-04-10

## 2018-04-10 RX ORDER — HEPARIN SODIUM 200 [USP'U]/100ML
INJECTION, SOLUTION INTRAVENOUS
Status: COMPLETED
Start: 2018-04-10 | End: 2018-04-10

## 2018-04-10 RX ORDER — ACETAMINOPHEN 325 MG/1
650 TABLET ORAL
Status: CANCELLED | OUTPATIENT
Start: 2018-04-10

## 2018-04-10 RX ORDER — HEPARIN SODIUM 200 [USP'U]/100ML
1000 INJECTION, SOLUTION INTRAVENOUS ONCE
Status: COMPLETED | OUTPATIENT
Start: 2018-04-10 | End: 2018-04-10

## 2018-04-10 RX ORDER — MIDAZOLAM HYDROCHLORIDE 1 MG/ML
INJECTION, SOLUTION INTRAMUSCULAR; INTRAVENOUS
Status: COMPLETED
Start: 2018-04-10 | End: 2018-04-10

## 2018-04-10 RX ORDER — CEFAZOLIN SODIUM/WATER 2 G/20 ML
2 SYRINGE (ML) INTRAVENOUS ONCE
Status: COMPLETED | OUTPATIENT
Start: 2018-04-10 | End: 2018-04-10

## 2018-04-10 RX ORDER — ACETAMINOPHEN 325 MG/1
TABLET ORAL
Status: COMPLETED
Start: 2018-04-10 | End: 2018-04-10

## 2018-04-10 RX ORDER — NALOXONE HYDROCHLORIDE 0.4 MG/ML
0.4 INJECTION, SOLUTION INTRAMUSCULAR; INTRAVENOUS; SUBCUTANEOUS AS NEEDED
Status: DISCONTINUED | OUTPATIENT
Start: 2018-04-10 | End: 2018-04-10 | Stop reason: HOSPADM

## 2018-04-10 RX ORDER — BACITRACIN 50000 [IU]/1
50000 INJECTION, POWDER, FOR SOLUTION INTRAMUSCULAR ONCE
Status: COMPLETED | OUTPATIENT
Start: 2018-04-10 | End: 2018-04-10

## 2018-04-10 RX ORDER — CEFAZOLIN SODIUM/WATER 2 G/20 ML
2 SYRINGE (ML) INTRAVENOUS ONCE
Status: DISCONTINUED | OUTPATIENT
Start: 2018-04-10 | End: 2018-04-10 | Stop reason: HOSPADM

## 2018-04-10 RX ORDER — ATENOLOL 25 MG/1
25 TABLET ORAL DAILY
Qty: 30 TAB | Refills: 0 | Status: SHIPPED | OUTPATIENT
Start: 2018-04-10 | End: 2018-05-08 | Stop reason: SDUPTHER

## 2018-04-10 RX ORDER — FENTANYL CITRATE 50 UG/ML
INJECTION, SOLUTION INTRAMUSCULAR; INTRAVENOUS
Status: COMPLETED
Start: 2018-04-10 | End: 2018-04-10

## 2018-04-10 RX ORDER — SODIUM CHLORIDE 0.9 % (FLUSH) 0.9 %
5-10 SYRINGE (ML) INJECTION AS NEEDED
Status: DISCONTINUED | OUTPATIENT
Start: 2018-04-10 | End: 2018-04-10 | Stop reason: HOSPADM

## 2018-04-10 RX ORDER — FENTANYL CITRATE 50 UG/ML
12.5-5 INJECTION, SOLUTION INTRAMUSCULAR; INTRAVENOUS
Status: DISCONTINUED | OUTPATIENT
Start: 2018-04-10 | End: 2018-04-10 | Stop reason: HOSPADM

## 2018-04-10 RX ADMIN — LIDOCAINE HYDROCHLORIDE 30 ML: 10 INJECTION, SOLUTION INFILTRATION; PERINEURAL at 08:24

## 2018-04-10 RX ADMIN — MIDAZOLAM HYDROCHLORIDE 2 MG: 1 INJECTION, SOLUTION INTRAMUSCULAR; INTRAVENOUS at 08:39

## 2018-04-10 RX ADMIN — FENTANYL CITRATE 25 MCG: 50 INJECTION, SOLUTION INTRAMUSCULAR; INTRAVENOUS at 08:24

## 2018-04-10 RX ADMIN — BACITRACIN 50000 UNITS: 5000 INJECTION, POWDER, FOR SOLUTION INTRAMUSCULAR at 08:55

## 2018-04-10 RX ADMIN — FENTANYL CITRATE 25 MCG: 50 INJECTION, SOLUTION INTRAMUSCULAR; INTRAVENOUS at 08:55

## 2018-04-10 RX ADMIN — MIDAZOLAM HYDROCHLORIDE 2 MG: 1 INJECTION, SOLUTION INTRAMUSCULAR; INTRAVENOUS at 08:24

## 2018-04-10 RX ADMIN — MIDAZOLAM HYDROCHLORIDE 2 MG: 1 INJECTION, SOLUTION INTRAMUSCULAR; INTRAVENOUS at 08:14

## 2018-04-10 RX ADMIN — MIDAZOLAM HYDROCHLORIDE 2 MG: 1 INJECTION INTRAMUSCULAR; INTRAVENOUS at 08:14

## 2018-04-10 RX ADMIN — FENTANYL CITRATE 50 MCG: 50 INJECTION, SOLUTION INTRAMUSCULAR; INTRAVENOUS at 08:14

## 2018-04-10 RX ADMIN — HEPARIN SODIUM 1000 UNITS: 200 INJECTION, SOLUTION INTRAVENOUS at 08:16

## 2018-04-10 RX ADMIN — Medication 2 G: at 08:15

## 2018-04-10 RX ADMIN — MIDAZOLAM HYDROCHLORIDE 2 MG: 1 INJECTION INTRAMUSCULAR; INTRAVENOUS at 08:24

## 2018-04-10 RX ADMIN — BACITRACIN 50000 UNITS: 50000 INJECTION, POWDER, FOR SOLUTION INTRAMUSCULAR at 08:55

## 2018-04-10 RX ADMIN — LIDOCAINE HYDROCHLORIDE 30 ML: 10 INJECTION INFILTRATION; PERINEURAL at 08:24

## 2018-04-10 RX ADMIN — ACETAMINOPHEN 650 MG: 325 TABLET ORAL at 12:17

## 2018-04-10 NOTE — DISCHARGE INSTRUCTIONS
[address] [place] [address]  [phone]        ICD/PACEMAKER DISCHARGE INSTRUCTIONS    Patient ID:  Himanshu Bryant  [id number]  80 y.o.  1955    Admit Date: 4/10/2018    Discharge Date: 4/10/2018     Admitting Physician: Adriane Candelaria MD     Discharge Physician: Adriane Candelaria MD    Admission Diagnoses:   SSS  SSS (sick sinus syndrome) [facility])    Discharge Diagnoses: Active Problems:    SSS (sick sinus syndrome) ([address] (3/15/2016)        Discharge Condition: Good    Cardiology Procedures this Admission:  pacemaker implant    Disposition: home    Reference discharge instructions provided by nursing for diet and activity. Follow-up with Dr. Romeo Smith in 2 weeks. Please contact the office for an appointment at [phone]. Signed:  Adriane Candelaria MD  4/10/2018  9:13 AM    DISCHARGE INSTRUCTIONS FOR PATIENTS WITH ICD'S AND PACEMAKERS    1. Carry you ID card for your ICD/Pacemaker with you at all times. This card will be given to you in the hospital or mailed to you. 2. Medic Alert Bracelets are available from your pharmacist to wear at all times. 3. Call for an appointment in 2 weeks [phone]. 4. The pacemaker will bulge slightly under your skin. An ICD bulges a little more because it is larger. The bulge will decrease in size over the next few weeks. Please notify the doctor's office if you notice any of the following around your ICD site:  A.  A bruise that does not go away  B. Soreness or yellow, green, or brown drainage from the site. C. Any swelling from the site. D. If you have a fever of 100 degrees or higher that lasts for a few days    INCISION CARE       1.  Leave dressing over your site until you see the doctor. 2.  Leave steri-strips over your site until they start to fall off.   3.   You may shower after as long as your incision isnt submerged or directly sprayed upon until well healed. 4.  For comfort, wear loose fitting clothing.   5. Ice pack to affected shoulder for first 24 hours, wear your sling for 2 days. 6.  Report any signs of infection, fever, pain, swelling, redness, oozing, or heat at site especially if these symptoms increase after the first 3 to 4 days. ACTIVITY PRECAUTIONS     1. Avoid rough contact with the implant site. 2. No driving for 14 days. 3. Avoid lifting your arm over your head, carrying anything on the affected side, or lifting over 10 pounds for 30 days. For the first 2 days only bend your arm at the elbow. 4. Any extreme activity such as golf, weight lifting or exercise biking should be restricted for 60 days. 5. Do not carry objects by holding them against your implant site. 6.  No shooting rifles or any type of gun with the affected shoulder permanently. 7.  If you have an ICD, welding and chainsaws are prohibited. SPECIAL PRECAUTIONS     1. You should avoid all strong magnetic fields, such as arc welding, large transformers, large motors. Some ICD devices will beep if it detects a strong magnet. If this occurs, move out of the area. 2.  You may not have an MRI. 3.  Treatments or surgery that requires diathermy or electrocautery should be discussed with your doctor before scheduled. 4. Avoid radio frequency transmitters, including radar. 5. Advise dentist or other medical personnel you see that you have a pacemaker or ICD. 6.  Cell phones and microwave oven use is okay. 7.  If you plan to move or take a trip to a new area, the doctor's office will give you a name of a doctor to contact for any problems. SPECIAL INSTRUCTIONS ON SHOCKS   1. Notify your doctor for any of the following:      A. Anytime a shock is received in a 24 hour period. An office visit is not usually required for a single shock. B.  Two or more shocks in a row. If you do not feel well, call the [facility], otherwise call your doctor. This may require an office visit.       C. Two or more shocks spaced apart by several hours. This may require an office visit. 2.  Keep a record of events. Include date, time, symptoms and activity at that time. ANTIBIOTIC THERAPY    During the first 8 weeks after your pacemaker or ICD insertion, you may need antibiotics before any dental work or certain tests or operations. Let the dentist or doctor who is caring for you know that you have had an implanted device.

## 2018-04-10 NOTE — PROGRESS NOTES
Ambulate with pt in persaud to BR; gait steady. Pt denies c/o. Both chest dressings dry and intact. DC instructions reviewed with pt and family; all verbalize understanding. SL dc'd without difficulty. Pt dc' to home with family via car.

## 2018-04-10 NOTE — H&P
Subjective:       Alfredo Garcia is a 58 y.o. male is here for follow up of AF s/p ablation. He is feeling well.   The patient denies chest pain/ shortness of breath, orthopnea, PND, LE edema, palpitations, syncope, presyncope or fatigue.             Patient Active Problem List     Diagnosis Date Noted    Irregular heartbeat 12/26/2017    S/P ablation of atrial fibrillation 05/02/2016    Paroxysmal atrial fibrillation ([address] 04/19/2016    SSS (sick sinus syndrome) ([address] 03/15/2016    Thrombocytopenia ([address] 01/05/2015    Atrial flutter ([address] 01/02/2015    Pneumonia 01/02/2015    Hypotension 01/02/2015    Hypertension 01/02/2015    Hyperlipidemia 01/02/2015    Atrial flutter with rapid ventricular response ([address] 01/02/2015      JAMES Dinero MD       Past Medical History:   Diagnosis Date    Arrhythmia       atrial flutter/atrial fibrillation    Arthritis       gout    Hypertension      Ill-defined condition       hypercholesterolemia    S/P ablation of atrial fibrillation 5/2/2016            Past Surgical History:   Procedure Laterality Date    HX GI   1997     anal fissure repair      No Known Allergies          Family History   Problem Relation Age of Onset    Other Mother         liver cirrhosis    Diabetes Father      Heart Disease Brother      negative for cardiac disease   Social History                Social History    Marital status:        Spouse name: N/A    Number of children: N/A    Years of education: N/A              Social History Main Topics     Smoking status: Never Smoker     Smokeless tobacco: Never Used     Alcohol use 0.5 oz/week       1 Cans of beer per week         Comment: every month     Drug use: No     Sexual activity: Yes            Other Topics Concern    None      Social History Narrative              Current Outpatient Prescriptions   Medication Sig    metroNIDAZOLE (METROCREAM) 0.75 % topical cream      aspirin delayed-release 81 mg tablet Take  by mouth daily.  sildenafil citrate (VIAGRA) 100 mg tablet Take 100 mg by mouth as needed.  atenolol (TENORMIN) 25 mg tablet Take 0.5 Tabs by mouth daily.  LEVITRA 20 mg tablet Take 20 mg by mouth as needed.  quinapril (ACCUPRIL) 40 mg tablet Take 40 mg by mouth daily.  zolpidem (AMBIEN) 10 mg tablet Take 10 mg by mouth nightly as needed.  hydrochlorothiazide (HYDRODIURIL) 25 mg tablet Take 1 tablet by mouth daily.  allopurinol (ZYLOPRIM) 300 mg tablet Take 300 mg by mouth daily.  atorvastatin (LIPITOR) 20 mg tablet Take 20 mg by mouth daily.  potassium chloride (K-DUR, KLOR-CON) 20 mEq tablet Take 20 mEq by mouth two (2) times a day.      No current facility-administered medications for this visit. Vitals:     03/06/18 1310   BP: 112/76   Pulse: (!) 48   Resp: 18   SpO2: 98%   Weight: 219 lb (99.3 kg)   Height: 6' 3\" (1.905 m)         I have reviewed the nurses notes, vitals, problem list, allergy list, medical history, family, social history and medications.     Review of Symptoms:     General: Pt denies excessive weight gain or loss. Pt is able to conduct ADL's  HEENT: Denies blurred vision, headaches, epistaxis and difficulty swallowing. Respiratory: Denies shortness of breath, GALEANO, wheezing or stridor. Cardiovascular: Denies precordial pain, palpitations, edema or PND  Gastrointestinal: Denies poor appetite, indigestion, abdominal pain or blood in stool  Urinary: Denies dysuria, pyuria  Musculoskeletal: Denies pain or swelling from muscles or joints  Neurologic: Denies tremor, paresthesias, or sensory motor disturbance  Skin: Denies rash, itching or texture change. Psych: Denies depression        Physical Exam:       General: Well developed, in no acute distress. HEENT: Eyes - PERRL, no jvd  Heart:  Normal S1/S2 negative S3 or S4.  Regular, no murmur, gallop or rub.   Respiratory: Clear bilaterally x 4, no wheezing or rales  Abdomen:   Soft, non-tender, bowel sounds are active.   Extremities:  No edema, normal cap refill, no cyanosis. Musculoskeletal: No clubbing  Neuro: A&Ox3, speech clear, gait stable. Skin: Skin color is normal. No rashes or lesions. Non diaphoretic  Vascular: 2+ pulses symmetric in all extremities     Cardiographics     Ekg: marked sinus bradycardia  ILR: bradycardia in the 30s, 3-5 second pauses            Results for orders placed or performed during the hospital encounter of 05/13/16   EKG, 12 LEAD, INITIAL   Result Value Ref Range     Ventricular Rate 90 BPM     Atrial Rate 90 BPM     P-R Interval 232 ms     QRS Duration 120 ms     Q-T Interval 376 ms     QTC Calculation (Bezet) 459 ms     Calculated P Axis 43 degrees     Calculated R Axis -54 degrees     Calculated T Axis 27 degrees     Diagnosis           Sinus rhythm with 1st degree AV block  Left axis deviation  Septal infarct (cited on or before 02-JAN-2015)  Inferior infarct , age undetermined  When compared with ECG of 05-JAN-2015 03:09,  Vent.  rate has increased BY  37 BPM  QRS axis shifted left  Inferior infarct is now present     Confirmed by Pierre Marcos ([id number]) on 5/13/2016 8:47:27 AM                  Lab Results   Component Value Date/Time     WBC 8.3 05/03/2016 03:42 AM     HGB 14.6 05/03/2016 03:42 AM     HCT 42.7 05/03/2016 03:42 AM     PLATELET 777 (L) 80/95/4122 03:42 AM     MCV 91.6 05/03/2016 03:42 AM            Lab Results   Component Value Date/Time     Sodium 138 05/03/2016 06:29 AM     Potassium 3.2 (L) 05/03/2016 06:29 AM     Chloride 103 05/03/2016 06:29 AM     CO2 31 05/03/2016 06:29 AM     Anion gap 4 (L) 05/03/2016 06:29 AM     Glucose 103 (H) 05/03/2016 06:29 AM     BUN 16 05/03/2016 06:29 AM     Creatinine 0.98 05/03/2016 06:29 AM     BUN/Creatinine ratio 16 05/03/2016 06:29 AM     GFR est AA >60 05/03/2016 06:29 AM     GFR est non-AA >60 05/03/2016 06:29 AM     Calcium 8.4 (L) 05/03/2016 06:29 AM     Bilirubin, total 0.8 04/25/2016 10:10 AM     AST (SGOT) 27 04/25/2016 10:10 AM     Alk. phosphatase 74 04/25/2016 10:10 AM     Protein, total 7.0 04/25/2016 10:10 AM     Albumin 4.5 04/25/2016 10:10 AM     Globulin 3.7 01/02/2015 05:40 AM     A-G Ratio 1.8 04/25/2016 10:10 AM     ALT (SGPT) 32 04/25/2016 10:10 AM          Assessment:      Assessment:           ICD-10-CM ICD-9-CM     1. Irregular heartbeat I49.9 427.9 AMB POC EKG ROUTINE W/ 12 LEADS, INTER & REP   2. Typical atrial flutter (HCC) I48.3 427.32     3. Paroxysmal atrial fibrillation (HCC) I48.0 427.31     4. SSS (sick sinus syndrome) (HCC) I49.5 427.81     5. Essential hypertension I10 401. 9              Orders Placed This Encounter    AMB POC EKG ROUTINE W/ 12 LEADS, INTER & REP       Order Specific Question:   Reason for Exam:       Answer:   routine          Plan:   Mr. Shante Vaca is here for follow up of AF s/p AF/AFL ablation s/p [facility]. He denies cardiac complaints. His EKG shows marked sinus bradycardia and ILR demonstrates sinus bradycardia with 3-5 second pauses. He remains on atenolol. He is a candidate for an ILR removal and implant of pacemaker. I discussed the risks/benefits/alternatives of the procedure with the patient. Risks include (but are not limited to) bleeding, infection, cva/mi/tamponade/death. The patient understands and agrees to proceed. Thank you for this interesting consultation. We will schedule.    Continue medical management for HTN, AF/AFL, SSS.      Thank you for allowing me to participate in Nataliya Pham 's care.     ELVER Colin MD, [facility]

## 2018-04-10 NOTE — IP AVS SNAPSHOT
[address] 
[phone] Patient: Alfredo Garcia MRN: [id number] About your hospitalization You were admitted on:  April 10, 2018 You last received care in the:  [facility] CARDIAC CATH LAB You were discharged on:  April 10, 2018 Why you were hospitalized Your primary diagnosis was:  Not on File Your diagnoses also included:  Sss (Sick Sinus Syndrome) (Formerly [facility]) Follow-up Information Follow up With Details Comments Contact Info Lisa Medina MD   [address] [place] [address] 
[phone] Your Scheduled Appointments Thursday April 26, 2018  2:00 PM EDT  
ESTABLISHED PATIENT with Neda Collazo NP [facility] [address] [place] [address]  
[phone] Thursday April 26, 2018  2:00 PM EDT PROCEDURE with PACEMAKER, [facility] [place] [facility] [address] [place] [address]  
[phone] Discharge Orders None A check payam indicates which time of day the medication should be taken. My Medications CHANGE how you take these medications Instructions Each Dose to Equal  
 Morning Noon Evening Bedtime  
 atenolol 25 mg tablet Commonly known as:  TENORMIN What changed:  how much to take Your last dose was: Your next dose is: Take 1 Tab by mouth daily for 30 days. 25 mg CONTINUE taking these medications Instructions Each Dose to Equal  
 Morning Noon Evening Bedtime  
 allopurinol 300 mg tablet Commonly known as:  Phineas Quince Your last dose was: Your next dose is: Take 300 mg by mouth daily. 300 mg  
    
   
   
   
  
 aspirin delayed-release 81 mg tablet Your last dose was: Your next dose is: Take  by mouth daily. atorvastatin 20 mg tablet Commonly known as:  LIPITOR Your last dose was: Your next dose is: Take 20 mg by mouth daily. 20 mg  
    
   
   
   
  
 hydroCHLOROthiazide 25 mg tablet Commonly known as:  HYDRODIURIL Your last dose was: Your next dose is: Take 1 tablet by mouth daily. 25 mg  
    
   
   
   
  
 quinapril 40 mg tablet Commonly known as:  ACCUPRIL Your last dose was: Your next dose is: Take 40 mg by mouth daily. 40 mg  
    
   
   
   
  
 VIAGRA 100 mg tablet Generic drug:  sildenafil citrate Your last dose was: Your next dose is: Take 100 mg by mouth as needed. 100 mg  
    
   
   
   
  
 zolpidem 10 mg tablet Commonly known as:  AMBIEN Your last dose was: Your next dose is: Take 10 mg by mouth nightly as needed. 10 mg Where to Get Your Medications These medications were sent to [address] [facility] 45  JOSE RAM[facility]  400 [facility], [address] [address] Phone:  [phone]  
  atenolol 25 mg tablet Discharge Instructions [address]  [phone] ICD/PACEMAKER DISCHARGE INSTRUCTIONS Patient ID: 
Melinda Perez 
[id number] 
52 y.o. 
1955 Admit Date: 4/10/2018 Discharge Date: 4/10/2018 Admitting Physician: Heaven Gonzalez MD  
 
Discharge Physician: Heaven Gonzalez MD 
 
Admission Diagnoses: SSS 
SSS (sick sinus syndrome) ([facility] Discharge Diagnoses: Active Problems: 
  SSS (sick sinus syndrome) ([facility] (3/15/2016) Discharge Condition: Good Cardiology Procedures this Admission:  pacemaker implant Disposition: home Reference discharge instructions provided by nursing for diet and activity. Follow-up with Dr. Reba Escobar in 2 weeks. Please contact the office for an appointment at [phone]. Signed: 
Ignacia Pizarro MD 
4/10/2018 
9:13 AM 
 
DISCHARGE INSTRUCTIONS FOR PATIENTS WITH ICD'S AND PACEMAKERS 1. Carry you ID card for your ICD/Pacemaker with you at all times. This card will be given to you in the hospital or mailed to you. 2. Medic Alert Bracelets are available from your pharmacist to wear at all times. 3. Call for an appointment in 2 weeks [phone]. 4. The pacemaker will bulge slightly under your skin. An ICD bulges a little more because it is larger. The bulge will decrease in size over the next few weeks. Please notify the doctor's office if you notice any of the following around your ICD site: A.  A bruise that does not go away B. Soreness or yellow, green, or brown drainage from the site. C. Any swelling from the site. D. If you have a fever of 100 degrees or higher that lasts for a few days INCISION CARE 1.  Leave dressing over your site until you see the doctor. 2.  Leave steri-strips over your site until they start to fall off.  
3.   You may shower after as long as your incision isnt submerged or directly sprayed upon until well healed. 4.  For comfort, wear loose fitting clothing. 5.  Ice pack to affected shoulder for first 24 hours, wear your sling for 2 days. 6.  Report any signs of infection, fever, pain, swelling, redness, oozing, or heat at site especially if these symptoms increase after the first 3 to 4 days. ACTIVITY PRECAUTIONS 1. Avoid rough contact with the implant site. 2. No driving for 14 days. 3. Avoid lifting your arm over your head, carrying anything on the affected side, or lifting over 10 pounds for 30 days. For the first 2 days only bend your arm at the elbow.  
4. Any extreme activity such as golf, weight lifting or exercise biking should be restricted for 60 days. 5. Do not carry objects by holding them against your implant site. 6.  No shooting rifles or any type of gun with the affected shoulder permanently. 7.  If you have an ICD, welding and chainsaws are prohibited. SPECIAL PRECAUTIONS 1. You should avoid all strong magnetic fields, such as arc welding, large transformers, large motors. Some ICD devices will beep if it detects a strong magnet. If this occurs, move out of the area. 2.  You may not have an MRI. 3.  Treatments or surgery that requires diathermy or electrocautery should be discussed with your doctor before scheduled. 4. Avoid radio frequency transmitters, including radar. 5. Advise dentist or other medical personnel you see that you have a pacemaker or ICD. 6.  Cell phones and microwave oven use is okay. 7.  If you plan to move or take a trip to a new area, the doctor's office will give you a name of a doctor to contact for any problems. SPECIAL INSTRUCTIONS ON SHOCKS 1. Notify your doctor for any of the following: A. Anytime a shock is received in a 24 hour period. An office visit is not usually required for a single shock. B.  Two or more shocks in a row. If you do not feel well, call the Rescue Squad, otherwise call your doctor. This may require an office visit. C. Two or more shocks spaced apart by several hours. This may require an office visit. 2.  Keep a record of events. Include date, time, symptoms and activity at that time. ANTIBIOTIC THERAPY During the first 8 weeks after your pacemaker or ICD insertion, you may need antibiotics before any dental work or certain tests or operations. Let the dentist or doctor who is caring for you know that you have had an implanted device. Introducing [facility]! Dear Dale Munoz: 
Thank you for requesting a zanda account.   Our records indicate that you already have an active Jobbr account. You can access your account anytime at [email or web address]. [employer]/HyperBranch [employer] Technology Did you know that you can access your hospital and ER discharge instructions at any time in Jobbr? You can also review all of your test results from your hospital stay or ER visit. Additional Information If you have questions, please visit the Frequently Asked Questions section of the Jobbr website at [email or web address]/HyperBranch [email or web address]/. Remember, Jobbr is NOT to be used for urgent needs. For medical emergencies, dial 911. Now available from your iPhone and Android! Introducing Gonzales Larson As a [email or web address] patient, I wanted to make you aware of our electronic visit tool called Gonzales Larson. Ksplice/Nagi allows you to connect within minutes with a medical provider 24 hours a day, seven days a week via a mobile device or tablet or logging into a secure website from your computer. You can access Gonzales Larson from anywhere in the [place]. A virtual visit might be right for you when you have a simple condition and feel like you just dont want to get out of bed, or cant get away from work for an appointment, when your regular [employer] provider is not available (evenings, weekends or holidays), or when youre out of town and need minor care. Electronic visits cost only $49 and if the Ksplice/Nagi provider determines a prescription is needed to treat your condition, one can be electronically transmitted to a nearby pharmacy*. Please take a moment to enroll today if you have not already done so. The enrollment process is free and takes just a few minutes. To enroll, please download the Ksplice/Nagi stephane to your tablet or phone, or visit [email or web address] to enroll on your computer.    
And, as an [address] [place] [address] patient with a [employer] account, the results of your visits will be scanned into your electronic medical record and your primary care provider will be able to view the scanned results. We urge you to continue to see your regular [employer] provider for your ongoing medical care. And while your primary care provider may not be the one available when you seek a Gonzales Larson virtual visit, the peace of mind you get from getting a real diagnosis real time can be priceless. For more information on Gonzales Garciafin, view our Frequently Asked Questions (FAQs) at [email or web address]. Sincerely, 
 
Anamaria Franco MD 
Chief Medical Officer Nathan Banda *:  certain medications cannot be prescribed via Gonzales Lisandrotatyfin Providers Seen During Your Hospitalization Provider Specialty Primary office phone Darcie Whitten MD Cardiology [phone] Your Primary Care Physician (PCP) Primary Care Physician Office Phone Office Fax Christen Voss [phone] You are allergic to the following No active allergies Recent Documentation Height Weight BMI Smoking Status 1.905 m 99.8 kg 27.5 kg/m2 Never Smoker Emergency Contacts Name Discharge Info Relation Home Work Mobile [address] CAREGIVER [3] Spouse [3] [phone] Patient Belongings The following personal items are in your possession at time of discharge: 
  Dental Appliances: None         Home Medications: None   Jewelry: None  Clothing: Footwear, Pants, Shirt, Undergarments    Other Valuables: None Please provide this summary of care documentation to your next provider. Signatures-by signing, you are acknowledging that this After Visit Summary has been reviewed with you and you have received a copy. Patient Signature:  ____________________________________________________________ Date:  ____________________________________________________________  
  
Dory Piety Provider Signature:  ____________________________________________________________ Date:  ____________________________________________________________

## 2018-04-10 NOTE — PROCEDURES
[address] [place] [address]  [phone]    Indications and Pre-Procedure Diagnosis:  Uma Frazier is a 58 y.o. male with sick sinus syndrome is referred for removal of ILR and implant of dual chamber pacemaker. Post Procedure Diagnosis:    Sick sinus syndrome    ILR removal and Pacemaker Implant Procedure and Findings:  Informed consent was obtained and the patient was premedicated with cefazolin. The procedure was performed under local anesthesia. Continuous pulse oximetry and cuff pressure were monitored. During the procedure, the patient received Versed and Fentanyl for sedation per nursing personnel. The left deltopectoral area was prepped and draped in the usual sterile fashion and was liberally infiltrated with 1% lidocaine. An incision was made over the ILR site. The ILR was removed with forceps without issue. The pocket was then liberally infiltrated with bacitracin solution. The wound was closed in layers using continuous 2-0 Vicryl and 4-0 Vicryl ending with a sub-cuticular closure. A bio-occlusive dressing were applied to the skin. An incision was made over the left subpectoral area and a generator pocket was manually dissected. Access was achieved in the left axillary vein under fluoroscopic guidance and using the seldinger technique. Through the left axillary vein, pacing leads were positioned in appropriate regions in the right heart chambers where satisfactory pacing and sensing parameters were measured. Stability of the leads was assessed with deep breathing and there was no diaphragmatic pacing at 10V output. The leads were anchored using the sleeves and a pulse generator pocket fashioned using blunt dissection. The leads were then connected to the pulse generator. The pulse generator pocket was then liberally infiltrated with bacitracin solution, and the device implanted with a single silk fixation suture in the header to prevent migration. The wound was closed in layers using continuous 2-0 Vicryl and 4-0 Vicryl ending with a sub-cuticular closure. A bio-occlusive dressing were applied to the skin. Fluoroscopy and total procedure times were 2 and 35 minutes respectively. Estimated blood loss <10 ml. Sharp count: correct. Specimen(s) collected: none. The following procedure related complication occurred: none. The following problems were encountered: none. Findings: successful ILR removal and pacemaker placement. Device Data Measurements:  Lead Sensing (mV) Threshold (V)Pulse Width (ms) Impedance (Ohms)  RA 1.9  1.6  0.5   793  RV 6.3  1.8  0.5   933      Final Programmed Parameters  Bradycardia pacing rate  60 bpm  Pacing Mode    AAIR-DDDR  Pacing Output    3.5 V@ 0.5 ms    Supplies Summary available in the chart  [employer]    Thank you for allowing me to participate in this patients care.     Ciera James MD, Jh Wilson

## 2018-04-10 NOTE — PROGRESS NOTES
Cardiac Cath Lab Recovery Arrival Note:      Yakelin Lemos arrived to Cardiac Cath Lab, Recovery Area. Staff introduced to patient. Patient identifiers verified with NAME and DATE OF BIRTH. Procedure verified with patient. Consent forms reviewed and signed by patient or authorized representative and verified. Allergies verified. Patient and family oriented to department. Patient and family informed of procedure and plan of care. Questions answered with review. Patient prepped for procedure, per orders from physician, prior to arrival.    Patient on cardiac monitor, non-invasive blood pressure, SPO2 monitor. On RA. Patient is A&Ox 4. Patient reports no complaints. Patient in stretcher, in low position, with side rails up, call bell within reach, patient instructed to call if assistance as needed. Patient prep in: [address], [place] [address].    Prep by: Gemma Rosado

## 2018-04-10 NOTE — PROGRESS NOTES
TRANSFER - IN REPORT:    Verbal report received from YEISON Keller RN on Kelsey Hays  being received from EP for routine progression of care. Report consisted of patients Situation, Background, Assessment and Recommendations(SBAR). Information from the following report(s) Procedure Summary and MAR was reviewed with the receiving clinician. Opportunity for questions and clarification was provided. Assessment completed upon patients arrival to [address] [place] [address] and care assumed. Cardiac Cath Lab Recovery Arrival Note:    Kelsey Hays arrived to [facility] recovery area. Patient procedure= PPI and linq explant. Patient on cardiac monitor, non-invasive blood pressure, SPO2 monitor. On O2 @ 2 lpm via nc. Patient status doing well without problems. Patient is A&Ox 3. Patient reports no c/o. PROCEDURE SITE CHECK:    Procedure site:without any bleeding and no hematoma, no pain/discomfort reported at procedure site. No change in patient status. Continue to monitor patient and status.

## 2018-04-26 ENCOUNTER — OFFICE VISIT (OUTPATIENT)
Dept: CARDIOLOGY CLINIC | Age: 63
End: 2018-04-26

## 2018-04-26 ENCOUNTER — CLINICAL SUPPORT (OUTPATIENT)
Dept: CARDIOLOGY CLINIC | Age: 63
End: 2018-04-26

## 2018-04-26 VITALS
OXYGEN SATURATION: 99 % | HEIGHT: 75 IN | RESPIRATION RATE: 18 BRPM | HEART RATE: 68 BPM | BODY MASS INDEX: 27.4 KG/M2 | DIASTOLIC BLOOD PRESSURE: 80 MMHG | WEIGHT: 220.4 LBS | SYSTOLIC BLOOD PRESSURE: 100 MMHG

## 2018-04-26 DIAGNOSIS — I49.5 SSS (SICK SINUS SYNDROME) (HCC): ICD-10-CM

## 2018-04-26 DIAGNOSIS — Z95.0 PACEMAKER: Primary | ICD-10-CM

## 2018-04-26 DIAGNOSIS — Z51.89 VISIT FOR WOUND CHECK: ICD-10-CM

## 2018-04-26 DIAGNOSIS — I49.9 IRREGULAR HEARTBEAT: Primary | ICD-10-CM

## 2018-04-26 DIAGNOSIS — I48.0 PAROXYSMAL ATRIAL FIBRILLATION (HCC): Chronic | ICD-10-CM

## 2018-04-26 DIAGNOSIS — I48.92 ATRIAL FLUTTER WITH RAPID VENTRICULAR RESPONSE (HCC): ICD-10-CM

## 2018-04-26 NOTE — PROGRESS NOTES
1. Have you been to the ER, urgent care clinic since your last visit? Hospitalized since your last visit? No    2. Have you seen or consulted any other health care providers outside of the [address] [place] [address] since your last visit? Include any pap smears or colon screening. No    Chief Complaint   Patient presents with    Pacemaker Check     FU appt DCPM.  C/O pain at PM site.

## 2018-04-26 NOTE — MR AVS SNAPSHOT
[address] [facility] 
[phone] Patient: Alva Segundo MRN: [id number] VPU:[id number]/1343 Visit Information Date & Time Provider Department Dept. Phone Encounter #  
 4/26/2018  2:00 PM Ganga Reddy, [address] [facility] [phone] Your Appointments 7/31/2018  2:15 PM  
New Patient with Liz Browning MD  
[facility] [address]) Appt Note: MDT DCPM 3 mo post op check, discuss carelink [id number] [facility]  
[phone] [address] [facility] [address] [address]  
  
    
 7/31/2018  2:15 PM  
PROCEDURE with PACEMAKER, [facility] [address]) Appt Note: MDT DCPM 3 mo post op check, discuss carelink [id number] [facility]  
[phone] [address] [facility] Upcoming Health Maintenance Date Due Hepatitis C Screening 1955 FOBT Q 1 YEAR AGE 50-75 1/4/2004 ZOSTER VACCINE AGE 60> 11/4/2013 Influenza Age 5 to Adult 8/1/2017 DTaP/Tdap/Td series (2 - Td) 7/2/2022 Allergies as of 4/26/2018  Review Complete On: 4/26/2018 By: Terry Mancia LPN No Known Allergies Current Immunizations  Never Reviewed Name Date Influenza Vaccine PF 1/3/2015  2:35 PM  
 TDAP Vaccine 7/2/2012 11:30 AM  
  
 Not reviewed this visit You Were Diagnosed With   
  
 Codes Comments Irregular heartbeat    -  Primary ICD-10-CM: I49.9 ICD-9-CM: 427.9 Vitals BP Pulse Resp Height(growth percentile) Weight(growth percentile) SpO2  
 100/80 (BP 1 Location: Right arm, BP Patient Position: Sitting) 68 18 6' 3\" (1.905 m) 220 lb 6.4 oz (100 kg) 99% BMI Smoking Status 27.55 kg/m2 Never Smoker Vitals History BMI and BSA Data Body Mass Index Body Surface Area 27.55 kg/m 2 2.3 m 2 Preferred Pharmacy Pharmacy Name Phone [id number] [address] [facility] [address] [phone] Your Updated Medication List  
  
   
This list is accurate as of 4/26/18  2:59 PM.  Always use your most recent med list.  
  
  
  
  
 allopurinol 300 mg tablet Commonly known as:  Ardia Erps Take 300 mg by mouth daily. aspirin delayed-release 81 mg tablet Take  by mouth daily. atenolol 25 mg tablet Commonly known as:  TENORMIN Take 1 Tab by mouth daily for 30 days. atorvastatin 20 mg tablet Commonly known as:  LIPITOR Take 20 mg by mouth daily. hydroCHLOROthiazide 25 mg tablet Commonly known as:  HYDRODIURIL Take 1 tablet by mouth daily. quinapril 40 mg tablet Commonly known as:  ACCUPRIL Take 40 mg by mouth daily. VIAGRA 100 mg tablet Generic drug:  sildenafil citrate Take 100 mg by mouth as needed. zolpidem 10 mg tablet Commonly known as:  AMBIEN Take 10 mg by mouth nightly as needed. We Performed the Following AMB POC EKG ROUTINE W/ 12 LEADS, INTER & REP [36350 CPT(R)] Introducing [facility]! Dear Pedro Araujo: 
Thank you for requesting a Spotsi account. Our records indicate that you already have an active Spotsi account. You can access your account anytime at [email or web address] [email or web address]/SmartPay Jieyin Did you know that you can access your hospital and ER discharge instructions at any time in Spotsi? You can also review all of your test results from your hospital stay or ER visit. Additional Information If you have questions, please visit the Frequently Asked Questions section of the Spotsi website at [email or web address] [email or web address] Jieyin/. Remember, Spotsi is NOT to be used for urgent needs. For medical emergencies, dial 911. Now available from your iPhone and Android! Please provide this summary of care documentation to your next provider. Your primary care clinician is listed as JAMES Marie. If you have any questions after today's visit, please call [phone].

## 2018-04-26 NOTE — PROGRESS NOTES
Ykaelin Lemos  1955  Yolanda Kelley MD          Subjective:    Patient is here for 2 week site check and device interrogation after ILR removal, pacemaker implantation. The patient denies chest pain or shortness of breath. Patient Active Problem List    Diagnosis Date Noted    Irregular heartbeat 12/26/2017    S/P ablation of atrial fibrillation 05/02/2016    Paroxysmal atrial fibrillation ([address] 04/19/2016    SSS (sick sinus syndrome) ([address] 03/15/2016    Thrombocytopenia ([address] 01/05/2015    Atrial flutter ([address] 01/02/2015    Pneumonia 01/02/2015    Hypotension 01/02/2015    Hypertension 01/02/2015    Hyperlipidemia 01/02/2015    Atrial flutter with rapid ventricular response ([address] 01/02/2015      Past Medical History:   Diagnosis Date    Arrhythmia     atrial flutter/atrial fibrillation    Arthritis     gout    Hypertension     Ill-defined condition     hypercholesterolemia    S/P ablation of atrial fibrillation 5/2/2016      Past Surgical History:   Procedure Laterality Date    HX GI  1997    anal fissure repair     No Known Allergies   Family History   Problem Relation Age of Onset    Other Mother      liver cirrhosis    Diabetes Father     Heart Disease Brother       Current Outpatient Prescriptions   Medication Sig    atenolol (TENORMIN) 25 mg tablet Take 1 Tab by mouth daily for 30 days.  aspirin delayed-release 81 mg tablet Take  by mouth daily.  sildenafil citrate (VIAGRA) 100 mg tablet Take 100 mg by mouth as needed.  quinapril (ACCUPRIL) 40 mg tablet Take 40 mg by mouth daily.  zolpidem (AMBIEN) 10 mg tablet Take 10 mg by mouth nightly as needed.  hydrochlorothiazide (HYDRODIURIL) 25 mg tablet Take 1 tablet by mouth daily.  allopurinol (ZYLOPRIM) 300 mg tablet Take 300 mg by mouth daily.  atorvastatin (LIPITOR) 20 mg tablet Take 20 mg by mouth daily. No current facility-administered medications for this visit.            Review of Systems:    General: Pt denies excessive weight gain or loss. Pt is able to conduct ADL's  Respiratory: Denies shortness of breath, GALEANO, wheezing or stridor. Cardiovascular: Denies precordial pain, palpitations, edema or PND        Physical ExamPhysical Exam:      General: Well developed, in no acute distress. .  Chest: left subclavian pacer site approximated well, steri-strips intact  Neuro: A&Ox3, speech clear, gait stable. Assessment:   Assessment:     ICD-10-CM ICD-9-CM    1. Irregular heartbeat I49.9 427.9 AMB POC EKG ROUTINE W/ 12 LEADS, INTER & REP   2. Paroxysmal atrial fibrillation (HCC) I48.0 427.31    3. SSS (sick sinus syndrome) ([facility]) I49.5 427.81    4. Visit for wound check Z51.89 V58.89         Plan:     Patient feels well following ILR removal and pacemaker implantation. Left subclavian pacemaker site approximated well, no discharge. ILR incision well approximated and healing. No drainage, no fever. Steri-strips are intact. No erythema or heat. Device interrogation demonstrates normal functioning (93.8% AP, <0.1% RVP). He has soreness in his left shoulder, reports that he has been wearing his sling for 10 days. Advised to slowly work into ROM exercises while following restrictions. Use Tylenol/Ice PRN for pain management. Follow up as planned.      Lulu Saldivar NP

## 2018-05-11 RX ORDER — ATENOLOL 25 MG/1
TABLET ORAL
Qty: 30 TAB | Refills: 0 | Status: SHIPPED | OUTPATIENT
Start: 2018-05-11 | End: 2018-06-11 | Stop reason: SDUPTHER

## 2018-05-21 ENCOUNTER — TELEPHONE (OUTPATIENT)
Dept: CARDIOLOGY CLINIC | Age: 63
End: 2018-05-21

## 2018-05-21 ENCOUNTER — OFFICE VISIT (OUTPATIENT)
Dept: CARDIOLOGY CLINIC | Age: 63
End: 2018-05-21

## 2018-05-21 VITALS
WEIGHT: 224 LBS | BODY MASS INDEX: 27.85 KG/M2 | HEIGHT: 75 IN | OXYGEN SATURATION: 98 % | HEART RATE: 70 BPM | SYSTOLIC BLOOD PRESSURE: 104 MMHG | DIASTOLIC BLOOD PRESSURE: 80 MMHG | RESPIRATION RATE: 18 BRPM

## 2018-05-21 DIAGNOSIS — Z95.818 STATUS POST PLACEMENT OF IMPLANTABLE LOOP RECORDER: ICD-10-CM

## 2018-05-21 DIAGNOSIS — Z95.0 PACEMAKER: ICD-10-CM

## 2018-05-21 DIAGNOSIS — I49.5 SSS (SICK SINUS SYNDROME) (HCC): Primary | ICD-10-CM

## 2018-05-21 DIAGNOSIS — Z51.89 VISIT FOR WOUND CHECK: ICD-10-CM

## 2018-05-21 RX ORDER — CEPHALEXIN 500 MG/1
500 CAPSULE ORAL 4 TIMES DAILY
Qty: 28 CAP | Refills: 0 | Status: SHIPPED | OUTPATIENT
Start: 2018-05-21 | End: 2018-05-29 | Stop reason: SDUPTHER

## 2018-05-21 RX ORDER — CEPHALEXIN 500 MG/1
500 CAPSULE ORAL 4 TIMES DAILY
Qty: 28 CAP | Refills: 0 | Status: SHIPPED | OUTPATIENT
Start: 2018-05-21 | End: 2018-05-21

## 2018-05-21 NOTE — PROGRESS NOTES
Subjective:      Melinda Perez is a 58 y.o. male is here for follow up s/p ILR removal and pacemaker implantation. He reports drainage from his Linq removal incision x 1 week. The patient denies chest pain/ shortness of breath, orthopnea, PND, LE edema, palpitations, syncope, presyncope or fatigue. Patient Active Problem List    Diagnosis Date Noted    Irregular heartbeat 12/26/2017    S/P ablation of atrial fibrillation 05/02/2016    Paroxysmal atrial fibrillation ([address] 04/19/2016    SSS (sick sinus syndrome) ([address] 03/15/2016    Thrombocytopenia (HCC) 01/05/2015    Atrial flutter ([address] 01/02/2015    Pneumonia 01/02/2015    Hypotension 01/02/2015    Hypertension 01/02/2015    Hyperlipidemia 01/02/2015    Atrial flutter with rapid ventricular response ([address] 01/02/2015      JAMES Troncoso MD  Past Medical History:   Diagnosis Date    Arrhythmia     atrial flutter/atrial fibrillation    Arthritis     gout    Hypertension     Ill-defined condition     hypercholesterolemia    S/P ablation of atrial fibrillation 5/2/2016      Past Surgical History:   Procedure Laterality Date    HX GI  1997    anal fissure repair     No Known Allergies   Family History   Problem Relation Age of Onset    Other Mother      liver cirrhosis    Diabetes Father     Heart Disease Brother     negative for cardiac disease  Social History     Social History    Marital status:      Spouse name: N/A    Number of children: N/A    Years of education: N/A     Social History Main Topics    Smoking status: Never Smoker    Smokeless tobacco: Never Used    Alcohol use 0.5 oz/week     1 Cans of beer per week      Comment: every month    Drug use: No    Sexual activity: Yes     Other Topics Concern    None     Social History Narrative     Current Outpatient Prescriptions   Medication Sig    cephALEXin (KEFLEX) 500 mg capsule Take 1 Cap by mouth four (4) times daily for 7 days.     atenolol (TENORMIN) 25 mg tablet TAKE 1 TABLET BY MOUTH EVERY DAY    aspirin delayed-release 81 mg tablet Take  by mouth daily.  sildenafil citrate (VIAGRA) 100 mg tablet Take 100 mg by mouth as needed.  quinapril (ACCUPRIL) 40 mg tablet Take 40 mg by mouth daily.  zolpidem (AMBIEN) 10 mg tablet Take 10 mg by mouth nightly as needed.  hydrochlorothiazide (HYDRODIURIL) 25 mg tablet Take 1 tablet by mouth daily.  allopurinol (ZYLOPRIM) 300 mg tablet Take 300 mg by mouth daily.  atorvastatin (LIPITOR) 20 mg tablet Take 20 mg by mouth daily. No current facility-administered medications for this visit. Vitals:    05/21/18 1427   BP: 104/80   Pulse: 70   Resp: 18   SpO2: 98%   Weight: 224 lb (101.6 kg)   Height: 6' 3\" (1.905 m)       I have reviewed the nurses notes, vitals, problem list, allergy list, medical history, family, social history and medications. Review of Symptoms:    General: Pt denies excessive weight gain or loss. Pt is able to conduct ADL's  HEENT: Denies blurred vision, headaches, epistaxis and difficulty swallowing. Respiratory: Denies shortness of breath, GALEANO, wheezing or stridor. Cardiovascular: Denies precordial pain, palpitations, edema or PND  Gastrointestinal: Denies poor appetite, indigestion, abdominal pain or blood in stool  Urinary: Denies dysuria, pyuria  Musculoskeletal: Denies pain or swelling from muscles or joints  Neurologic: Denies tremor, paresthesias, or sensory motor disturbance  Skin: Denies rash, itching or texture change. Psych: Denies depression      Physical Exam:      General: Well developed, in no acute distress. HEENT: Eyes - PERRL, no jvd  Heart:  Normal S1/S2 negative S3 or S4. Regular, no murmur, gallop or rub.   Respiratory: Clear bilaterally x 4, no wheezing or rales  Abdomen:   Soft, non-tender, bowel sounds are active.   Extremities:  No edema, normal cap refill, no cyanosis. Musculoskeletal: No clubbing  Neuro: A&Ox3, speech clear, gait stable. Skin: +left 4th intercostal space incision, not approximated to either end of incision, serous drainage  Vascular: 2+ pulses symmetric in all extremities        Results for orders placed or performed during the hospital encounter of 05/13/16   EKG, 12 LEAD, INITIAL   Result Value Ref Range    Ventricular Rate 90 BPM    Atrial Rate 90 BPM    P-R Interval 232 ms    QRS Duration 120 ms    Q-T Interval 376 ms    QTC Calculation (Bezet) 459 ms    Calculated P Axis 43 degrees    Calculated R Axis -54 degrees    Calculated T Axis 27 degrees    Diagnosis       Sinus rhythm with 1st degree AV block  Left axis deviation  Septal infarct (cited on or before 02-JAN-2015)  Inferior infarct , age undetermined  When compared with ECG of 05-JAN-2015 03:09,  Vent. rate has increased BY  37 BPM  QRS axis shifted left  Inferior infarct is now present    Confirmed by Stefanie Conroy ([id number]) on 5/13/2016 8:47:27 AM           Lab Results   Component Value Date/Time    WBC 4.8 04/02/2018 08:14 AM    HGB 15.4 04/02/2018 08:14 AM    HCT 44.5 04/02/2018 08:14 AM    PLATELET 498 58/36/6111 08:14 AM    MCV 92 04/02/2018 08:14 AM      Lab Results   Component Value Date/Time    Sodium 142 04/02/2018 08:14 AM    Potassium 4.4 04/02/2018 08:14 AM    Chloride 99 04/02/2018 08:14 AM    CO2 28 04/02/2018 08:14 AM    Anion gap 4 (L) 05/03/2016 06:29 AM    Glucose 109 (H) 04/02/2018 08:14 AM    BUN 26 04/02/2018 08:14 AM    Creatinine 0.98 04/02/2018 08:14 AM    BUN/Creatinine ratio 27 (H) 04/02/2018 08:14 AM    GFR est AA 94 04/02/2018 08:14 AM    GFR est non-AA 81 04/02/2018 08:14 AM    Calcium 9.6 04/02/2018 08:14 AM    Bilirubin, total 0.6 04/02/2018 08:14 AM    AST (SGOT) 21 04/02/2018 08:14 AM    Alk.  phosphatase 66 04/02/2018 08:14 AM    Protein, total 6.6 04/02/2018 08:14 AM    Albumin 4.3 04/02/2018 08:14 AM    Globulin 3.7 01/02/2015 05:40 AM    A-G Ratio 1.9 04/02/2018 08:14 AM    ALT (SGPT) 21 04/02/2018 08:14 AM         Assessment: Assessment:        ICD-10-CM ICD-9-CM    1. SSS (sick sinus syndrome) ([facility]) I49.5 427.81 CBC WITH AUTOMATED DIFF   2. Status post placement of implantable loop recorder Z95.818 V45.09    3. Pacemaker Z95.0 V45.01    4. Visit for wound check Z51.89 V58.89      Orders Placed This Encounter    CBC WITH AUTOMATED DIFF    DISCONTD: cephALEXin (KEFLEX) 500 mg capsule     Sig: Take 1 Cap by mouth four (4) times daily for 7 days. Dispense:  28 Cap     Refill:  0    cephALEXin (KEFLEX) 500 mg capsule     Sig: Take 1 Cap by mouth four (4) times daily for 7 days. Dispense:  28 Cap     Refill:  0        Plan:   Mr. Christiano Son is here for follow up s/p pacemaker implantation and ILR for complaints of oozing at his Linq removal site for the past week. Dressing removed and site inspected. Incision not approximated at either end. He denies fever, no erythema present. Serous drainage from both ends of incision. Protruding stitch was present at the lateral aspect of incision and it was trimmed. Provided 7 days of Keflex 500mg, four times daily. Will have him return in one week for incision check. Thank you for allowing me to participate in Himanshu Bryant 's care.     Ibrahima Sapp NP

## 2018-05-21 NOTE — PROGRESS NOTES
1. Have you been to the ER, urgent care clinic since your last visit? Hospitalized since your last visit? No    2. Have you seen or consulted any other health care providers outside of the [address] [place] [address] since your last visit? Include any pap smears or colon screening. No    Chief Complaint   Patient presents with    Other     Oozing at [facility] extraction site.

## 2018-05-21 NOTE — TELEPHONE ENCOUNTER
Spoke with patient - scheduled with Theresa Dueñas NP to evaluate the Linq removal site on Monday, May 21, 2018 02:30 PM

## 2018-05-21 NOTE — TELEPHONE ENCOUNTER
Mr Sushant Rob called and advised his pacemaker site is oozing and red. Its been 6 weeks since his procedure so he is a little concerned and was questioning if he should be seen. Please call him on his cell phone [phone] to advise.     Thanks  Juan Rhodes

## 2018-05-22 LAB
BASOPHILS # BLD AUTO: 0 X10E3/UL (ref 0–0.2)
BASOPHILS NFR BLD AUTO: 1 %
EOSINOPHIL # BLD AUTO: 0.2 X10E3/UL (ref 0–0.4)
EOSINOPHIL NFR BLD AUTO: 3 %
ERYTHROCYTE [DISTWIDTH] IN BLOOD BY AUTOMATED COUNT: 13.3 % (ref 12.3–15.4)
HCT VFR BLD AUTO: 45.5 % (ref 37.5–51)
HGB BLD-MCNC: 16 G/DL (ref 13–17.7)
IMM GRANULOCYTES # BLD: 0 X10E3/UL (ref 0–0.1)
IMM GRANULOCYTES NFR BLD: 0 %
LYMPHOCYTES # BLD AUTO: 2.3 X10E3/UL (ref 0.7–3.1)
LYMPHOCYTES NFR BLD AUTO: 31 %
MCH RBC QN AUTO: 32.6 PG (ref 26.6–33)
MCHC RBC AUTO-ENTMCNC: 35.2 G/DL (ref 31.5–35.7)
MCV RBC AUTO: 93 FL (ref 79–97)
MONOCYTES # BLD AUTO: 0.4 X10E3/UL (ref 0.1–0.9)
MONOCYTES NFR BLD AUTO: 6 %
NEUTROPHILS # BLD AUTO: 4.4 X10E3/UL (ref 1.4–7)
NEUTROPHILS NFR BLD AUTO: 59 %
PLATELET # BLD AUTO: 163 X10E3/UL (ref 150–379)
RBC # BLD AUTO: 4.91 X10E6/UL (ref 4.14–5.8)
WBC # BLD AUTO: 7.4 X10E3/UL (ref 3.4–10.8)

## 2018-05-29 ENCOUNTER — OFFICE VISIT (OUTPATIENT)
Dept: CARDIOLOGY CLINIC | Age: 63
End: 2018-05-29

## 2018-05-29 VITALS
SYSTOLIC BLOOD PRESSURE: 110 MMHG | HEART RATE: 77 BPM | OXYGEN SATURATION: 99 % | BODY MASS INDEX: 27.68 KG/M2 | WEIGHT: 222.6 LBS | RESPIRATION RATE: 16 BRPM | DIASTOLIC BLOOD PRESSURE: 72 MMHG | HEIGHT: 75 IN

## 2018-05-29 DIAGNOSIS — I48.0 PAROXYSMAL ATRIAL FIBRILLATION (HCC): Primary | Chronic | ICD-10-CM

## 2018-05-29 DIAGNOSIS — Z86.79 S/P ABLATION OF ATRIAL FIBRILLATION: ICD-10-CM

## 2018-05-29 DIAGNOSIS — Z95.0 PACEMAKER: ICD-10-CM

## 2018-05-29 DIAGNOSIS — Z51.89 ENCOUNTER FOR WOUND RE-CHECK: ICD-10-CM

## 2018-05-29 DIAGNOSIS — Z98.890 S/P ABLATION OF ATRIAL FIBRILLATION: ICD-10-CM

## 2018-05-29 DIAGNOSIS — I49.5 SSS (SICK SINUS SYNDROME) (HCC): ICD-10-CM

## 2018-05-29 RX ORDER — CEPHALEXIN 500 MG/1
500 CAPSULE ORAL 4 TIMES DAILY
Qty: 28 CAP | Refills: 0 | Status: SHIPPED | OUTPATIENT
Start: 2018-05-29 | End: 2018-06-05

## 2018-05-29 NOTE — MR AVS SNAPSHOT
[address] [facility] 
[phone] Patient: Tom Quiles MRN: [id number] Visit Information Date & Time Provider Department Dept. Phone Encounter #  
 5/29/2018 10:15 AM Shruti Dumont, [address] [facility] [phone] [id number] Your Appointments 6/21/2018  1:15 PM  
ESTABLISHED PATIENT with Marisol Sun NP [facility] [address] [place] [address]) Appt Note: Fady Weiss, f/u with incision,jar  
 [address]  
[phone] [address]  
  
    
 7/31/2018  2:15 PM  
New Patient with Cheryn Jeans, MD  
[facility] [address] [place] [address]) Appt Note: MDT DCPM 3 mo post op check, discuss carelink [address]  
[phone] [address] [address]  
  
    
 7/31/2018  2:15 PM  
PROCEDURE with PACEMAKER, [facility] [address]) Appt Note: MDT DCPM 3 mo post op check, discuss carelink [address]  
[phone] [address] Upcoming Health Maintenance Date Due Hepatitis C Screening 1955 FOBT Q 1 YEAR AGE 50-75 1/4/2004 ZOSTER VACCINE AGE 60> 11/4/2013 Influenza Age 5 to Adult 8/1/2018 DTaP/Tdap/Td series (2 - Td) 7/2/2022 Allergies as of 5/29/2018  Review Complete On: 5/29/2018 By: Aissatou Cobb No Known Allergies Current Immunizations  Never Reviewed Name Date Influenza Vaccine PF 1/3/2015  2:35 PM  
 TDAP Vaccine 7/2/2012 11:30 AM  
  
 Not reviewed this visit You Were Diagnosed With   
  
 Codes Comments Paroxysmal atrial fibrillation (HCC)    -  Primary ICD-10-CM: I48.0 ICD-9-CM: 427.31   
 SSS (sick sinus syndrome) ([facility])     ICD-10-CM: I49.5 ICD-9-CM: 427.81 S/P ablation of atrial fibrillation     ICD-10-CM: Z98.890, Z86.79 
ICD-9-CM: V45.89 Pacemaker     ICD-10-CM: Z95.0 ICD-9-CM: V45.01 Encounter for wound re-check     ICD-10-CM: Z51.89 ICD-9-CM: V58.89 Vitals BP Pulse Resp Height(growth percentile) Weight(growth percentile) SpO2  
 110/72 (BP 1 Location: Right arm, BP Patient Position: Sitting) 77 16 6' 3\" (1.905 m) 222 lb 9.6 oz (101 kg) 99% BMI Smoking Status 27.82 kg/m2 Never Smoker Vitals History BMI and BSA Data Body Mass Index Body Surface Area  
 27.82 kg/m 2 2.31 m 2 Preferred Pharmacy Pharmacy Name Phone [facility] [address] [phone] Your Updated Medication List  
  
   
This list is accurate as of 5/29/18 10:55 AM.  Always use your most recent med list.  
  
  
  
  
 allopurinol 300 mg tablet Commonly known as:  Tony Snowman Take 300 mg by mouth daily. aspirin delayed-release 81 mg tablet Take  by mouth daily. atenolol 25 mg tablet Commonly known as:  TENORMIN  
TAKE 1 TABLET BY MOUTH EVERY DAY  
  
 atorvastatin 20 mg tablet Commonly known as:  LIPITOR Take 20 mg by mouth daily. cephALEXin 500 mg capsule Commonly known as:  Mercedes Emilee Take 1 Cap by mouth four (4) times daily for 7 days. hydroCHLOROthiazide 25 mg tablet Commonly known as:  HYDRODIURIL Take 1 tablet by mouth daily. quinapril 40 mg tablet Commonly known as:  ACCUPRIL Take 40 mg by mouth daily. VIAGRA 100 mg tablet Generic drug:  sildenafil citrate Take 100 mg by mouth as needed. zolpidem 10 mg tablet Commonly known as:  AMBIEN Take 10 mg by mouth nightly as needed. Prescriptions Sent to Pharmacy Refills  
 cephALEXin (KEFLEX) 500 mg capsule 0 Sig: Take 1 Cap by mouth four (4) times daily for 7 days. Class: Normal  
 Pharmacy: [facility] [address], [address] [place] [address] RD.  #: [phone] Route: Oral  
  
Introducing [facility]! Dear Todd Bhakta: 
Thank you for requesting a [employer] account. Our records indicate that you already have an active [employer] account. You can access your account anytime at [email or web address] Did you know that you can access your hospital and ER discharge instructions at any time in [employer]? You can also review all of your test results from your hospital stay or ER visit. Additional Information If you have questions, please visit the Frequently Asked Questions section of the [employer] website at [email or web address] Remember, [employer] is NOT to be used for urgent needs. For medical emergencies, dial 911. Now available from your iPhone and Android! Please provide this summary of care documentation to your next provider. Your primary care clinician is listed as JAMES Foster 47. If you have any questions after today's visit, please call [phone].

## 2018-05-29 NOTE — PROGRESS NOTES
Subjective:      Joseph Rossi is a 58 y.o. male is here for follow up incision check. He reports there is still drainage from the site, denies fever. The patient denies chest pain/ shortness of breath, orthopnea, PND, LE edema, palpitations, syncope, presyncope or fatigue. Patient Active Problem List    Diagnosis Date Noted    Irregular heartbeat 12/26/2017    S/P ablation of atrial fibrillation 05/02/2016    Paroxysmal atrial fibrillation ([address] 04/19/2016    SSS (sick sinus syndrome) ([address] 03/15/2016    Thrombocytopenia ([address] 01/05/2015    Atrial flutter ([address] 01/02/2015    Pneumonia 01/02/2015    Hypotension 01/02/2015    Hypertension 01/02/2015    Hyperlipidemia 01/02/2015    [facility] 01/02/2015      JAMES Kaufman MD  Past Medical History:   Diagnosis Date    Arrhythmia     atrial flutter/atrial fibrillation    Arthritis     gout    Hypertension     Ill-defined condition     hypercholesterolemia    S/P ablation of atrial fibrillation 5/2/2016      Past Surgical History:   Procedure Laterality Date    HX GI  1997    anal fissure repair     No Known Allergies   Family History   Problem Relation Age of Onset    Other Mother      liver cirrhosis    Diabetes Father     Heart Disease Brother     negative for cardiac disease  Social History     Social History    Marital status:      Spouse name: N/A    Number of children: N/A    Years of education: N/A     Social History Main Topics    Smoking status: Never Smoker    Smokeless tobacco: Never Used    Alcohol use 0.5 oz/week     1 Cans of beer per week      Comment: every month    Drug use: No    Sexual activity: Yes     Other Topics Concern    None     Social History Narrative     Current Outpatient Prescriptions   Medication Sig    atenolol (TENORMIN) 25 mg tablet TAKE 1 TABLET BY MOUTH EVERY DAY    aspirin delayed-release 81 mg tablet Take  by mouth daily.     sildenafil citrate (VIAGRA) 100 mg tablet Take 100 mg by mouth as needed.  quinapril (ACCUPRIL) 40 mg tablet Take 40 mg by mouth daily.  zolpidem (AMBIEN) 10 mg tablet Take 10 mg by mouth nightly as needed.  hydrochlorothiazide (HYDRODIURIL) 25 mg tablet Take 1 tablet by mouth daily.  allopurinol (ZYLOPRIM) 300 mg tablet Take 300 mg by mouth daily.  atorvastatin (LIPITOR) 20 mg tablet Take 20 mg by mouth daily. No current facility-administered medications for this visit. Vitals:    05/29/18 1015   BP: 110/72   Pulse: 77   Resp: 16   SpO2: 99%   Weight: 222 lb 9.6 oz (101 kg)   Height: 6' 3\" (1.905 m)       I have reviewed the nurses notes, vitals, problem list, allergy list, medical history, family, social history and medications. Review of Symptoms:    General: Pt denies excessive weight gain or loss. Pt is able to conduct ADL's  HEENT: Denies blurred vision, headaches, epistaxis and difficulty swallowing. Respiratory: Denies shortness of breath, GALEANO, wheezing or stridor. Cardiovascular: Denies precordial pain, palpitations, edema or PND  Gastrointestinal: Denies poor appetite, indigestion, abdominal pain or blood in stool  Urinary: Denies dysuria, pyuria  Musculoskeletal: Denies pain or swelling from muscles or joints  Neurologic: Denies tremor, paresthesias, or sensory motor disturbance  Skin: Denies rash, itching or texture change. Psych: Denies depression      Physical Exam:      General: Well developed, in no acute distress. HEENT: Eyes - PERRL, no jvd  Heart:  Normal S1/S2 negative S3 or S4. Regular, no murmur, gallop or rub.   Respiratory: Clear bilaterally x 4, no wheezing or rales  Abdomen:   Soft, non-tender, bowel sounds are active.   Extremities:  No edema, normal cap refill, no cyanosis. Musculoskeletal: No clubbing  Neuro: A&Ox3, speech clear, gait stable.    Skin: +left 4th intercostal space not approximated with serous drainage  Vascular: 2+ pulses symmetric in all extremities    Cardiographics        Results for orders placed or performed during the hospital encounter of 05/13/16   EKG, 12 LEAD, INITIAL   Result Value Ref Range    Ventricular Rate 90 BPM    Atrial Rate 90 BPM    P-R Interval 232 ms    QRS Duration 120 ms    Q-T Interval 376 ms    QTC Calculation (Bezet) 459 ms    Calculated P Axis 43 degrees    Calculated R Axis -54 degrees    Calculated T Axis 27 degrees    Diagnosis       Sinus rhythm with 1st degree AV block  Left axis deviation  Septal infarct (cited on or before 02-JAN-2015)  Inferior infarct , age undetermined  When compared with ECG of 05-JAN-2015 03:09,  Vent. rate has increased BY  37 BPM  QRS axis shifted left  Inferior infarct is now present    Confirmed by Jessa Olson ([id number]) on 5/13/2016 8:47:27 AM           Lab Results   Component Value Date/Time    WBC 7.4 05/21/2018 03:01 PM    HGB 16.0 05/21/2018 03:01 PM    HCT 45.5 05/21/2018 03:01 PM    PLATELET 111 39/41/0293 03:01 PM    MCV 93 05/21/2018 03:01 PM      Lab Results   Component Value Date/Time    Sodium 142 04/02/2018 08:14 AM    Potassium 4.4 04/02/2018 08:14 AM    Chloride 99 04/02/2018 08:14 AM    CO2 28 04/02/2018 08:14 AM    Anion gap 4 (L) 05/03/2016 06:29 AM    Glucose 109 (H) 04/02/2018 08:14 AM    BUN 26 04/02/2018 08:14 AM    Creatinine 0.98 04/02/2018 08:14 AM    BUN/Creatinine ratio 27 (H) 04/02/2018 08:14 AM    GFR est AA 94 04/02/2018 08:14 AM    GFR est non-AA 81 04/02/2018 08:14 AM    Calcium 9.6 04/02/2018 08:14 AM    Bilirubin, total 0.6 04/02/2018 08:14 AM    AST (SGOT) 21 04/02/2018 08:14 AM    Alk. phosphatase 66 04/02/2018 08:14 AM    Protein, total 6.6 04/02/2018 08:14 AM    Albumin 4.3 04/02/2018 08:14 AM    Globulin 3.7 01/02/2015 05:40 AM    A-G Ratio 1.9 04/02/2018 08:14 AM    ALT (SGPT) 21 04/02/2018 08:14 AM         Assessment:     Assessment:        ICD-10-CM ICD-9-CM    1. Paroxysmal atrial fibrillation (HCC) I48.0 427.31    2.  SSS (sick sinus syndrome) ([facility] I49.5 427.81    3. S/P ablation of atrial fibrillation Z98.890 V45.89     Z86.79     4. Pacemaker Z95.0 V45.01    5. Encounter for wound re-check Z51.89 V58.89      No orders of the defined types were placed in this encounter. Plan:   Mr. Alyssa Leblanc is here for wound check s/p pacemaker implantation and ILR. Last visit, he had complaints of oozing at his Linq removal site for the past week. Serous drainage from both ends of incision. Protruding stitch was present at the lateral aspect of incision and it was trimmed. He completed 7 days of Keflex 500mg, four times daily. His site has less erythema, denies fever, and lateral ends are not approximated with drainage still present. Continue medical management for AF, SSS. Thank you for allowing me to participate in Lyndell Pallas 's care.     ELVER Sharif MD, Stephon Lopez

## 2018-05-29 NOTE — PROGRESS NOTES
1. Have you been to the ER, urgent care clinic since your last visit? Hospitalized since your last visit? No    2. Have you seen or consulted any other health care providers outside of the [address] [place] [address] since your last visit? Include any pap smears or colon screening.  No    Chief Complaint   Patient presents with    Other     incision check; pt reports tenderness around site

## 2018-07-31 ENCOUNTER — OFFICE VISIT (OUTPATIENT)
Dept: CARDIOLOGY CLINIC | Age: 63
End: 2018-07-31

## 2018-07-31 ENCOUNTER — CLINICAL SUPPORT (OUTPATIENT)
Dept: CARDIOLOGY CLINIC | Age: 63
End: 2018-07-31

## 2018-07-31 VITALS
WEIGHT: 224.4 LBS | HEIGHT: 75 IN | DIASTOLIC BLOOD PRESSURE: 70 MMHG | BODY MASS INDEX: 27.9 KG/M2 | HEART RATE: 64 BPM | SYSTOLIC BLOOD PRESSURE: 102 MMHG | RESPIRATION RATE: 18 BRPM | OXYGEN SATURATION: 98 %

## 2018-07-31 DIAGNOSIS — Z95.0 PACEMAKER: ICD-10-CM

## 2018-07-31 DIAGNOSIS — I48.0 PAROXYSMAL ATRIAL FIBRILLATION (HCC): Chronic | ICD-10-CM

## 2018-07-31 DIAGNOSIS — I49.5 SSS (SICK SINUS SYNDROME) (HCC): ICD-10-CM

## 2018-07-31 DIAGNOSIS — I10 ESSENTIAL HYPERTENSION: ICD-10-CM

## 2018-07-31 DIAGNOSIS — Z95.0 PACEMAKER: Primary | ICD-10-CM

## 2018-07-31 DIAGNOSIS — I49.9 IRREGULAR HEARTBEAT: Primary | ICD-10-CM

## 2018-07-31 DIAGNOSIS — Z86.79 S/P ABLATION OF ATRIAL FIBRILLATION: ICD-10-CM

## 2018-07-31 DIAGNOSIS — Z98.890 S/P ABLATION OF ATRIAL FIBRILLATION: ICD-10-CM

## 2018-07-31 DIAGNOSIS — I48.3 TYPICAL ATRIAL FLUTTER (HCC): Chronic | ICD-10-CM

## 2018-07-31 NOTE — PROGRESS NOTES
1. Have you been to the ER, urgent care clinic since your last visit? Hospitalized since your last visit? No    2. Have you seen or consulted any other health care providers outside of the [address] [place] [address] since your last visit? Include any pap smears or colon screening. No    Chief Complaint   Patient presents with    Irregular Heart Beat     3 mo appt. Discuss carelink. Denied cardiac symptoms.

## 2018-07-31 NOTE — PROGRESS NOTES
See device report - MDT DCPM in office device check, next check remotely in 3 months and in office in 6 months.

## 2018-07-31 NOTE — PROGRESS NOTES
Subjective:      Effie Jaquez is a 58 y.o. male is here for follow up s/p pacemaker implantation and ILR removal. He is doing well and denies cardiac complaints. The patient denies chest pain/ shortness of breath, orthopnea, PND, LE edema, palpitations, syncope, presyncope or fatigue.        Patient Active Problem List    Diagnosis Date Noted    Irregular heartbeat 12/26/2017    S/P ablation of atrial fibrillation 05/02/2016    Paroxysmal atrial fibrillation ([address] 04/19/2016    SSS (sick sinus syndrome) ([address] 03/15/2016    Thrombocytopenia ([address] 01/05/2015    Atrial flutter ([address] 01/02/2015    Pneumonia 01/02/2015    Hypotension 01/02/2015    Hypertension 01/02/2015    Hyperlipidemia 01/02/2015    Atrial flutter with rapid ventricular response ([address] 01/02/2015      JAMES Donis MD  Past Medical History:   Diagnosis Date    Arrhythmia     atrial flutter/atrial fibrillation    Arthritis     gout    Hypertension     Ill-defined condition     hypercholesterolemia    S/P ablation of atrial fibrillation 5/2/2016      Past Surgical History:   Procedure Laterality Date    HX GI  1997    anal fissure repair     No Known Allergies   Family History   Problem Relation Age of Onset    Other Mother      liver cirrhosis    Diabetes Father     Heart Disease Brother     negative for cardiac disease  Social History     Social History    Marital status:      Spouse name: N/A    Number of children: N/A    Years of education: N/A     Social History Main Topics    Smoking status: Never Smoker    Smokeless tobacco: Never Used    Alcohol use 0.5 oz/week     1 Cans of beer per week      Comment: every month    Drug use: No    Sexual activity: Yes     Other Topics Concern    None     Social History Narrative     Current Outpatient Prescriptions   Medication Sig    atenolol (TENORMIN) 25 mg tablet TAKE 1 TABLET BY MOUTH EVERY DAY    aspirin delayed-release 81 mg tablet Take  by mouth daily.    sildenafil citrate (VIAGRA) 100 mg tablet Take 100 mg by mouth as needed.  quinapril (ACCUPRIL) 40 mg tablet Take 40 mg by mouth daily.  zolpidem (AMBIEN) 10 mg tablet Take 10 mg by mouth nightly as needed.  hydrochlorothiazide (HYDRODIURIL) 25 mg tablet Take 1 tablet by mouth daily.  allopurinol (ZYLOPRIM) 300 mg tablet Take 300 mg by mouth daily.  atorvastatin (LIPITOR) 20 mg tablet Take 20 mg by mouth daily. No current facility-administered medications for this visit. Vitals:    07/31/18 1440   BP: 102/70   Pulse: 64   Resp: 18   SpO2: 98%   Weight: 224 lb 6.4 oz (101.8 kg)   Height: 6' 3\" (1.905 m)       I have reviewed the nurses notes, vitals, problem list, allergy list, medical history, family, social history and medications. Review of Symptoms:    General: Pt denies excessive weight gain or loss. Pt is able to conduct ADL's  HEENT: Denies blurred vision, headaches, epistaxis and difficulty swallowing. Respiratory: Denies shortness of breath, GALEANO, wheezing or stridor. Cardiovascular: Denies precordial pain, palpitations, edema or PND  Gastrointestinal: Denies poor appetite, indigestion, abdominal pain or blood in stool  Urinary: Denies dysuria, pyuria  Musculoskeletal: Denies pain or swelling from muscles or joints  Neurologic: Denies tremor, paresthesias, or sensory motor disturbance  Skin: Denies rash, itching or texture change. Psych: Denies depression      Physical Exam:      General: Well developed, in no acute distress. HEENT: Eyes - PERRL, no jvd  Heart:  Normal S1/S2 negative S3 or S4. Regular, no murmur, gallop or rub.   Respiratory: Clear bilaterally x 4, no wheezing or rales  Abdomen:   Soft, non-tender, bowel sounds are active.   Extremities:  No edema, normal cap refill, no cyanosis. Musculoskeletal: No clubbing  Neuro: A&Ox3, speech clear, gait stable. Skin: Skin color is normal. No rashes or lesions.  Non diaphoretic  Vascular: 2+ pulses symmetric in all extremities    Cardiographics    Ekg: sinus rhythm     Results for orders placed or performed during the hospital encounter of 05/13/16   EKG, 12 LEAD, INITIAL   Result Value Ref Range    Ventricular Rate 90 BPM    Atrial Rate 90 BPM    P-R Interval 232 ms    QRS Duration 120 ms    Q-T Interval 376 ms    QTC Calculation (Bezet) 459 ms    Calculated P Axis 43 degrees    Calculated R Axis -54 degrees    Calculated T Axis 27 degrees    Diagnosis       Sinus rhythm with 1st degree AV block  Left axis deviation  Septal infarct (cited on or before 02-JAN-2015)  Inferior infarct , age undetermined  When compared with ECG of 05-JAN-2015 03:09,  Vent. rate has increased BY  37 BPM  QRS axis shifted left  Inferior infarct is now present    Confirmed by Angel Fishman ([id number]) on 5/13/2016 8:47:27 AM           Lab Results   Component Value Date/Time    WBC 7.4 05/21/2018 03:01 PM    HGB 16.0 05/21/2018 03:01 PM    HCT 45.5 05/21/2018 03:01 PM    PLATELET 388 15/12/9591 03:01 PM    MCV 93 05/21/2018 03:01 PM      Lab Results   Component Value Date/Time    Sodium 142 04/02/2018 08:14 AM    Potassium 4.4 04/02/2018 08:14 AM    Chloride 99 04/02/2018 08:14 AM    CO2 28 04/02/2018 08:14 AM    Anion gap 4 (L) 05/03/2016 06:29 AM    Glucose 109 (H) 04/02/2018 08:14 AM    BUN 26 04/02/2018 08:14 AM    Creatinine 0.98 04/02/2018 08:14 AM    BUN/Creatinine ratio 27 (H) 04/02/2018 08:14 AM    GFR est AA 94 04/02/2018 08:14 AM    GFR est non-AA 81 04/02/2018 08:14 AM    Calcium 9.6 04/02/2018 08:14 AM    Bilirubin, total 0.6 04/02/2018 08:14 AM    AST (SGOT) 21 04/02/2018 08:14 AM    Alk. phosphatase 66 04/02/2018 08:14 AM    Protein, total 6.6 04/02/2018 08:14 AM    Albumin 4.3 04/02/2018 08:14 AM    Globulin 3.7 01/02/2015 05:40 AM    A-G Ratio 1.9 04/02/2018 08:14 AM    ALT (SGPT) 21 04/02/2018 08:14 AM         Assessment:     Assessment:        ICD-10-CM ICD-9-CM    1.  Irregular heartbeat I49.9 427.9 AMB POC EKG ROUTINE W/ 12 LEADS, INTER & REP   2. Paroxysmal atrial fibrillation (HCC) I48.0 427.31    3. SSS (sick sinus syndrome) ([facility]) I49.5 427.81    4. Essential hypertension I10 401.9    5. S/P ablation of atrial fibrillation Z98.890 V45.89     Z86.79     6. Pacemaker Z95.0 V45.01      Orders Placed This Encounter    AMB POC EKG ROUTINE W/ 12 LEADS, INTER & REP     Order Specific Question:   Reason for Exam:     Answer:   routine        Plan:   Mr. Mayco Saunders is here for follow up s/p pacemaker implantation and ILR removal. He is doing well and denies cardiac complaints. EKG shows sinus rhythm. His device interrogation demonstrates normal functioning (98.5% AP, 0.1% RVP). He can continue current medical therapy and follow up in one year. Continue medical management for HTN, AF. Thank you for allowing me to participate in Chivo Maddie 's care. Joyce Houston NP    Patient seen and examined by me with nurse practitioner. I personally performed all components of the history, physical, and medical decision making and agree with the assessment and plan with minor modifications as noted. He is A paced for his sick sinus. He has not had any AF. Cont med rx for htn and hyperlipidemia. F/u in one year,.     Karina Fuentes MD, Veronica Ralph

## 2018-11-05 ENCOUNTER — CLINICAL SUPPORT (OUTPATIENT)
Dept: CARDIOLOGY CLINIC | Age: 63
End: 2018-11-05

## 2018-11-05 DIAGNOSIS — I49.5 SSS (SICK SINUS SYNDROME) (HCC): ICD-10-CM

## 2018-11-05 DIAGNOSIS — I48.92 ATRIAL FLUTTER, UNSPECIFIED TYPE (HCC): Chronic | ICD-10-CM

## 2018-11-05 DIAGNOSIS — Z95.0 CARDIAC PACEMAKER IN SITU: Primary | ICD-10-CM

## 2019-01-31 ENCOUNTER — CLINICAL SUPPORT (OUTPATIENT)
Dept: CARDIOLOGY CLINIC | Age: 64
End: 2019-01-31

## 2019-01-31 ENCOUNTER — OFFICE VISIT (OUTPATIENT)
Dept: CARDIOLOGY CLINIC | Age: 64
End: 2019-01-31

## 2019-01-31 VITALS
DIASTOLIC BLOOD PRESSURE: 80 MMHG | WEIGHT: 224 LBS | OXYGEN SATURATION: 98 % | RESPIRATION RATE: 16 BRPM | SYSTOLIC BLOOD PRESSURE: 118 MMHG | HEART RATE: 65 BPM | HEIGHT: 75 IN | BODY MASS INDEX: 27.85 KG/M2

## 2019-01-31 DIAGNOSIS — Z95.0 CARDIAC PACEMAKER IN SITU: ICD-10-CM

## 2019-01-31 DIAGNOSIS — I48.0 PAROXYSMAL ATRIAL FIBRILLATION (HCC): Primary | Chronic | ICD-10-CM

## 2019-01-31 DIAGNOSIS — I49.9 IRREGULAR HEARTBEAT: ICD-10-CM

## 2019-01-31 DIAGNOSIS — Z98.890 S/P ABLATION OF ATRIAL FIBRILLATION: ICD-10-CM

## 2019-01-31 DIAGNOSIS — E78.2 MIXED HYPERLIPIDEMIA: ICD-10-CM

## 2019-01-31 DIAGNOSIS — I48.92 ATRIAL FLUTTER, UNSPECIFIED TYPE (HCC): ICD-10-CM

## 2019-01-31 DIAGNOSIS — I48.0 PAROXYSMAL ATRIAL FIBRILLATION (HCC): ICD-10-CM

## 2019-01-31 DIAGNOSIS — Z86.79 S/P ABLATION OF ATRIAL FIBRILLATION: ICD-10-CM

## 2019-01-31 DIAGNOSIS — Z95.0 CARDIAC PACEMAKER IN SITU: Primary | ICD-10-CM

## 2019-01-31 DIAGNOSIS — R00.1 BRADYCARDIA: ICD-10-CM

## 2019-01-31 DIAGNOSIS — I49.5 SSS (SICK SINUS SYNDROME) (HCC): ICD-10-CM

## 2019-01-31 NOTE — PROGRESS NOTES
Subjective:      Bjial Estevez is a 71 y.o. male is here for annual EP follow up. The patient denies chest pain/ shortness of breath, orthopnea, PND, LE edema, palpitations, syncope, presyncope or fatigue. Feels well overall. Patient Active Problem List    Diagnosis Date Noted    Irregular heartbeat 12/26/2017    S/P ablation of atrial fibrillation 05/02/2016    [facility]ox[facility]mal [facility] 04/19/2016    SSS (sick sinus syndrome) ([facility] 03/15/2016    Thrombocytopenia ([facility] 01/05/2015    Atrial flutter ([facility] 01/02/2015    Pneumonia 01/02/2015    Hypotension 01/02/2015    Hypertension 01/02/2015    Hyperlipidemia 01/02/2015    [facility] 01/02/2015      Abhinav Nelson MD  Past Medical History:   Diagnosis Date    Arrhythmia     atrial flutter/atrial fibrillation    Arthritis     gout    Hypertension     Ill-defined condition     hypercholesterolemia    S/P ablation of atrial fibrillation 5/2/2016      Past Surgical History:   Procedure Laterality Date    HX GI  1997    anal fissure repair     No Known Allergies   Family History   Problem Relation Age of Onset    Other Mother         liver cirrhosis    Diabetes Father     Heart Disease Brother     negative for cardiac disease  Social History     Socioeconomic History    Marital status:      Spouse name: Not on file    Number of children: Not on file    Years of education: Not on file    Highest education level: Not on file   Tobacco Use    Smoking status: Never Smoker    Smokeless tobacco: Never Used   Substance and Sexual Activity    Alcohol use:  Yes     Alcohol/week: 0.5 oz     Types: 1 Cans of beer per week     Comment: every month    Drug use: No    Sexual activity: Yes     Current Outpatient Medications   Medication Sig    atenolol (TENORMIN) 25 mg tablet TAKE 1 TABLET BY MOUTH EVERY DAY    sildenafil citrate (VIAGRA) 100 mg tablet Take 100 mg by mouth as needed.  quinapril (ACCUPRIL) 40 mg tablet Take 40 mg by mouth daily.  zolpidem (AMBIEN) 10 mg tablet Take 10 mg by mouth nightly as needed.  hydrochlorothiazide (HYDRODIURIL) 25 mg tablet Take 1 tablet by mouth daily.  allopurinol (ZYLOPRIM) 300 mg tablet Take 300 mg by mouth daily.  atorvastatin (LIPITOR) 20 mg tablet Take 20 mg by mouth daily.  aspirin delayed-release 81 mg tablet Take  by mouth daily. No current facility-administered medications for this visit. Vitals:    01/31/19 1437   BP: 118/80   Pulse: 65   Resp: 16   SpO2: 98%   Weight: 224 lb (101.6 kg)   Height: 6' 3\" (1.905 m)       I have reviewed the nurses notes, vitals, problem list, allergy list, medical history, family, social history and medications. Review of Symptoms:    General: Pt denies excessive weight gain or loss. Pt is able to conduct ADL's  HEENT: Denies blurred vision, headaches, epistaxis and difficulty swallowing. Respiratory: Denies shortness of breath, GALEANO, wheezing or stridor. Cardiovascular: Denies precordial pain, palpitations, edema or PND  Gastrointestinal: Denies poor appetite, indigestion, abdominal pain or blood in stool  Urinary: Denies dysuria, pyuria  Musculoskeletal: Denies pain or swelling from muscles or joints  Neurologic: Denies tremor, paresthesias, or sensory motor disturbance  Skin: Denies rash, itching or texture change. Psych: Denies depression      Physical Exam:      General: Well developed, in no acute distress. HEENT: Eyes - PERRL, no jvd  Heart:  Normal S1/S2 negative S3 or S4. Regular, no murmur, gallop or rub.   Respiratory: Clear bilaterally x 4, no wheezing or rales  Extremities:  No edema, normal cap refill, no cyanosis. Musculoskeletal: No clubbing  Neuro: A&Ox3, speech clear, gait stable. Skin: Skin color is normal. No rashes or lesions.  Non diaphoretic  Vascular: 2+ pulses symmetric in all extremities    Cardiographics    Ekg: Sinus Rhythm   -Nonspecific QRS widening.    -Old inferior infarct  -Old anterior infarct. Results for orders placed or performed during the hospital encounter of 05/13/16   EKG, 12 LEAD, INITIAL   Result Value Ref Range    Ventricular Rate 90 BPM    Atrial Rate 90 BPM    P-R Interval 232 ms    QRS Duration 120 ms    Q-T Interval 376 ms    QTC Calculation (Bezet) 459 ms    Calculated P Axis 43 degrees    Calculated R Axis -54 degrees    Calculated T Axis 27 degrees    Diagnosis       Sinus rhythm with 1st degree AV block  Left axis deviation  Septal infarct (cited on or before 02-JAN-2015)  Inferior infarct , age undetermined  When compared with ECG of 05-JAN-2015 03:09,  Vent. rate has increased BY  37 BPM  QRS axis shifted left  Inferior infarct is now present    Confirmed by Lucina Jose ([id number]) on 5/13/2016 8:47:27 AM           Lab Results   Component Value Date/Time    WBC 7.4 05/21/2018 03:01 PM    HGB 16.0 05/21/2018 03:01 PM    HCT 45.5 05/21/2018 03:01 PM    PLATELET 899 51/36/9495 03:01 PM    MCV 93 05/21/2018 03:01 PM      Lab Results   Component Value Date/Time    Sodium 142 04/02/2018 08:14 AM    Potassium 4.4 04/02/2018 08:14 AM    Chloride 99 04/02/2018 08:14 AM    CO2 28 04/02/2018 08:14 AM    Anion gap 4 (L) 05/03/2016 06:29 AM    Glucose 109 (H) 04/02/2018 08:14 AM    BUN 26 04/02/2018 08:14 AM    Creatinine 0.98 04/02/2018 08:14 AM    BUN/Creatinine ratio 27 (H) 04/02/2018 08:14 AM    GFR est AA 94 04/02/2018 08:14 AM    GFR est non-AA 81 04/02/2018 08:14 AM    Calcium 9.6 04/02/2018 08:14 AM    Bilirubin, total 0.6 04/02/2018 08:14 AM    AST (SGOT) 21 04/02/2018 08:14 AM    Alk.  phosphatase 66 04/02/2018 08:14 AM    Protein, total 6.6 04/02/2018 08:14 AM    Albumin 4.3 04/02/2018 08:14 AM    Globulin 3.7 01/02/2015 05:40 AM    A-G Ratio 1.9 04/02/2018 08:14 AM    ALT (SGPT) 21 04/02/2018 08:14 AM      Lab Results   Component Value Date/Time    TSH 2.44 01/02/2015 05:40 AM        Assessment:           ICD-10-CM ICD-9-CM 1. Paroxysmal atrial fibrillation (HCC) I48.0 427.31 AMB POC EKG ROUTINE W/ 12 LEADS, INTER & REP   2. Irregular heartbeat I49.9 427.9    3. Bradycardia R00.1 427.89    4. Mixed hyperlipidemia E78.2 272.2    5. S/P ablation of atrial fibrillation Z98.890 V45.89     Z86.79     6. Cardiac pacemaker in situ Z95.0 V45.01      Orders Placed This Encounter    AMB POC EKG ROUTINE W/ 12 LEADS, INTER & REP     Order Specific Question:   Reason for Exam:     Answer:   routine        Plan:     Mr. Albania Rodriguez is here for follow up s/p pacemaker implantation and ILR removal (4/10/18). He is doing well and denies cardiac complaints. EKG shows sinus rhythm. His device interrogation demonstrates normal functioning (98.5% AP, 0.1% RVP). He can continue current medical therapy, monitor PM remotely and follow up in one year.      Continue medical management for HTN, AF    Thank you for allowing me to participate in Deidre Oseguera 's care.       Ally Milton NP

## 2019-05-06 ENCOUNTER — CLINICAL SUPPORT (OUTPATIENT)
Dept: CARDIOLOGY CLINIC | Age: 64
End: 2019-05-06

## 2019-05-06 DIAGNOSIS — R00.1 BRADYCARDIA: ICD-10-CM

## 2019-05-06 DIAGNOSIS — Z95.0 PACEMAKER: Primary | ICD-10-CM

## 2019-05-06 DIAGNOSIS — I48.0 PAROXYSMAL ATRIAL FIBRILLATION (HCC): ICD-10-CM

## 2019-08-12 ENCOUNTER — CLINICAL SUPPORT (OUTPATIENT)
Dept: CARDIOLOGY CLINIC | Age: 64
End: 2019-08-12

## 2019-08-12 DIAGNOSIS — I49.5 SSS (SICK SINUS SYNDROME) (HCC): ICD-10-CM

## 2019-08-12 DIAGNOSIS — Z95.0 CARDIAC PACEMAKER IN SITU: Primary | ICD-10-CM

## 2019-11-18 ENCOUNTER — CLINICAL SUPPORT (OUTPATIENT)
Dept: CARDIOLOGY CLINIC | Age: 64
End: 2019-11-18

## 2019-11-18 DIAGNOSIS — I49.5 SSS (SICK SINUS SYNDROME) (HCC): ICD-10-CM

## 2019-11-18 DIAGNOSIS — Z95.0 CARDIAC PACEMAKER IN SITU: Primary | ICD-10-CM

## 2020-06-29 RX ORDER — POTASSIUM CHLORIDE 20 MEQ/1
1 TABLET, EXTENDED RELEASE ORAL DAILY
COMMUNITY

## 2020-06-30 ENCOUNTER — CLINICAL SUPPORT (OUTPATIENT)
Dept: CARDIOLOGY CLINIC | Age: 65
End: 2020-06-30

## 2020-06-30 ENCOUNTER — OFFICE VISIT (OUTPATIENT)
Dept: CARDIOLOGY CLINIC | Age: 65
End: 2020-06-30

## 2020-06-30 VITALS
RESPIRATION RATE: 18 BRPM | OXYGEN SATURATION: 98 % | WEIGHT: 236.5 LBS | DIASTOLIC BLOOD PRESSURE: 84 MMHG | SYSTOLIC BLOOD PRESSURE: 118 MMHG | HEIGHT: 75 IN | BODY MASS INDEX: 29.4 KG/M2 | HEART RATE: 62 BPM

## 2020-06-30 DIAGNOSIS — E78.2 MIXED HYPERLIPIDEMIA: ICD-10-CM

## 2020-06-30 DIAGNOSIS — I48.0 PAROXYSMAL ATRIAL FIBRILLATION (HCC): Primary | Chronic | ICD-10-CM

## 2020-06-30 DIAGNOSIS — I48.3 TYPICAL ATRIAL FLUTTER (HCC): ICD-10-CM

## 2020-06-30 DIAGNOSIS — I49.5 SSS (SICK SINUS SYNDROME) (HCC): ICD-10-CM

## 2020-06-30 DIAGNOSIS — Z95.0 CARDIAC PACEMAKER IN SITU: ICD-10-CM

## 2020-06-30 DIAGNOSIS — I10 ESSENTIAL HYPERTENSION: ICD-10-CM

## 2020-06-30 DIAGNOSIS — Z95.0 CARDIAC PACEMAKER IN SITU: Primary | ICD-10-CM

## 2020-06-30 NOTE — PROGRESS NOTES
Subjective:      Maylin London is a 76 y.o. male is here for EP consult. The patient denies chest pain/ shortness of breath, orthopnea, PND, LE edema, palpitations, syncope, presyncope or fatigue. Patient Active Problem List    Diagnosis Date Noted    Irregular heartbeat 12/26/2017    S/P ablation of atrial fibrillation 05/02/2016    Paroxysmal atrial fibrillation ([address] 04/19/2016    SSS (sick sinus syndrome) ([address] 03/15/2016    Thrombocytopenia ([address] 01/05/2015    Atrial flutter ([address] 01/02/2015    Pneumonia 01/02/2015    Hypotension 01/02/2015    Hypertension 01/02/2015    Hyperlipidemia 01/02/2015    Atrial flutter with rapid ventricular response ([address] 01/02/2015      Hollie Lucero MD  Past Medical History:   Diagnosis Date    Arrhythmia     atrial flutter/atrial fibrillation    Arthritis     gout    Hypertension     Ill-defined condition     hypercholesterolemia    S/P ablation of atrial fibrillation 5/2/2016      Past Surgical History:   Procedure Laterality Date    HX GI  1997    anal fissure repair     No Known Allergies   Family History   Problem Relation Age of Onset    Other Mother         liver cirrhosis    Diabetes Father     Heart Disease Brother     negative for cardiac disease  Social History     Socioeconomic History    Marital status:      Spouse name: Not on file    Number of children: Not on file    Years of education: Not on file    Highest education level: Not on file   Tobacco Use    Smoking status: Never Smoker    Smokeless tobacco: Never Used   Substance and Sexual Activity    Alcohol use: Yes     Alcohol/week: 0.8 standard drinks     Types: 1 Cans of beer per week     Comment: every month    Drug use: No    Sexual activity: Yes     Current Outpatient Medications   Medication Sig    potassium chloride (K-DUR, KLOR-CON) 20 mEq tablet Take 1 Tab by mouth daily.     atenolol (TENORMIN) 25 mg tablet TAKE 1 TABLET BY MOUTH EVERY DAY    sildenafil citrate (VIAGRA) 100 mg tablet Take 100 mg by mouth as needed.  quinapril (ACCUPRIL) 40 mg tablet Take 40 mg by mouth daily.  zolpidem (AMBIEN) 10 mg tablet Take 10 mg by mouth nightly as needed.  hydrochlorothiazide (HYDRODIURIL) 25 mg tablet Take 1 tablet by mouth daily.  allopurinol (ZYLOPRIM) 300 mg tablet Take 300 mg by mouth daily.  atorvastatin (LIPITOR) 20 mg tablet Take 20 mg by mouth daily.  aspirin delayed-release 81 mg tablet Take  by mouth daily. No current facility-administered medications for this visit. Vitals:    06/30/20 1038   BP: 118/84   Pulse: 62   Resp: 18   SpO2: 98%   Weight: 236 lb 8 oz (107.3 kg)   Height: 6' 3\" (1.905 m)       I have reviewed the nurses notes, vitals, problem list, allergy list, medical history, family, social history and medications. Review of Symptoms:    General: Pt denies excessive weight gain or loss. Pt is able to conduct ADL's  HEENT: Denies blurred vision, headaches, epistaxis and difficulty swallowing. Respiratory: Denies shortness of breath, GALEANO, wheezing or stridor. Cardiovascular: Denies precordial pain, palpitations, edema or PND  Gastrointestinal: Denies poor appetite, indigestion, abdominal pain or blood in stool  Urinary: Denies dysuria, pyuria  Musculoskeletal: Denies pain or swelling from muscles or joints  Neurologic: Denies tremor, paresthesias, or sensory motor disturbance  Skin: Denies rash, itching or texture change. Psych: Denies depression    Physical Exam:      General: Well developed, in no acute distress. HEENT: Eyes - PERRL, no jvd  Heart:  Normal S1/S2 negative S3 or S4. Regular, no murmur, gallop or rub. Respiratory: Clear bilaterally x 4, no wheezing or rales  Extremities:  No edema, normal cap refill, no cyanosis. Musculoskeletal: No clubbing  Neuro: A&Ox3, speech clear, gait stable. Skin: Skin color is normal. No rashes or lesions.  Non diaphoretic  Vascular: 2+ pulses symmetric in all extremities    Cardiographics    Ekg: Sinus  Rhythm   -Nonspecific QRS widening.    -Old inferior infarct  -Old anterior infarct. Results for orders placed or performed during the hospital encounter of 05/13/16   EKG, 12 LEAD, INITIAL   Result Value Ref Range    Ventricular Rate 90 BPM    Atrial Rate 90 BPM    P-R Interval 232 ms    QRS Duration 120 ms    Q-T Interval 376 ms    QTC Calculation (Bezet) 459 ms    Calculated P Axis 43 degrees    Calculated R Axis -54 degrees    Calculated T Axis 27 degrees    Diagnosis       Sinus rhythm with 1st degree AV block  Left axis deviation  Septal infarct (cited on or before 02-JAN-2015)  Inferior infarct , age undetermined  When compared with ECG of 05-JAN-2015 03:09,  Vent. rate has increased BY  37 BPM  QRS axis shifted left  Inferior infarct is now present    Confirmed by Stan Chauhan ([id number]) on 5/13/2016 8:47:27 AM           Lab Results   Component Value Date/Time    WBC 7.4 05/21/2018 03:01 PM    HGB 16.0 05/21/2018 03:01 PM    HCT 45.5 05/21/2018 03:01 PM    PLATELET 018 67/75/1304 03:01 PM    MCV 93 05/21/2018 03:01 PM      Lab Results   Component Value Date/Time    Sodium 142 04/02/2018 08:14 AM    Potassium 4.4 04/02/2018 08:14 AM    Chloride 99 04/02/2018 08:14 AM    CO2 28 04/02/2018 08:14 AM    Anion gap 4 (L) 05/03/2016 06:29 AM    Glucose 109 (H) 04/02/2018 08:14 AM    BUN 26 04/02/2018 08:14 AM    Creatinine 0.98 04/02/2018 08:14 AM    BUN/Creatinine ratio 27 (H) 04/02/2018 08:14 AM    GFR est AA 94 04/02/2018 08:14 AM    GFR est non-AA 81 04/02/2018 08:14 AM    Calcium 9.6 04/02/2018 08:14 AM    Bilirubin, total 0.6 04/02/2018 08:14 AM    Alk.  phosphatase 66 04/02/2018 08:14 AM    Protein, total 6.6 04/02/2018 08:14 AM    Albumin 4.3 04/02/2018 08:14 AM    Globulin 3.7 01/02/2015 05:40 AM    A-G Ratio 1.9 04/02/2018 08:14 AM    ALT (SGPT) 21 04/02/2018 08:14 AM      Lab Results   Component Value Date/Time    TSH 2.44 01/02/2015 05:40 AM Assessment:             ICD-10-CM ICD-9-CM    1. Paroxysmal atrial fibrillation (HCC) I48.0 427.31 AMB POC EKG ROUTINE W/ 12 LEADS, INTER & REP   2. SSS (sick sinus syndrome) (HCC) I49.5 427.81    3. Typical atrial flutter (HCC) I48.3 427.32    4. Cardiac pacemaker in situ Z95.0 V45.01    5. Essential hypertension I10 401.9    6. Mixed hyperlipidemia E78.2 272.2      Orders Placed This Encounter    AMB POC EKG ROUTINE W/ 12 LEADS, INTER & REP     Order Specific Question:   Reason for Exam:     Answer:   routine    potassium chloride (K-DUR, KLOR-CON) 20 mEq tablet     Sig: Take 1 Tab by mouth daily. Plan:     Mr. Deya Muse is here for follow up s/p pacemaker implantation and ILR removal (4/10/18). He is doing well and denies cardiac complaints. EKG shows sinus rhythm. He is normotensive. His device interrogation demonstrates normal functioning (99% AP, 0.1% RVP). His device has > 7 years battery remaining. He can continue current medical therapy, monitor PM remotely and follow up in one year.      Continue medical management for HTN, AF       Thank you for allowing me to participate in Bel Medina 's care. Van Bronson NP    Patient seen and examined. All pertinent data reviewed. I have reviewed detailed note as outlined by Van Bronson NP. Case discussed with Nursing/medical assistant staff and Van Bronson NP. Plans as outlined. A paced for sick sinus. Cont med rx for htn and hyperlipidemia.  F/u in one year    Genaro Malhotra MD, Savannah Frazier

## 2020-06-30 NOTE — LETTER
6/30/20 Patient: Omero Jackson YOB: 1955 Date of Visit: 6/30/2020 Kaye Martinez MD 
[id number] [place] [address] [address] [address] VIA Facsimile: [phone] Dear Kaye Martinez MD, Thank you for referring Mr. Garland Reynolds to [address] [place], [place] [address]al[place] for evaluation. My notes for this consultation are attached. If you have questions, please do not hesitate to call me. I look forward to following your patient along with you. Sincerely, Julianne Richard MD

## 2020-06-30 NOTE — PROGRESS NOTES
Chief Complaint   Patient presents with    Pacemaker Check     annual follow up     Ankle swelling     very little      1. Have you been to the ER, urgent care clinic since your last visit? Hospitalized since your last visit? No     2. Have you seen or consulted any other health care providers outside of the [address] [place] [address] since your last visit? Include any pap smears or colon screening.   Yes Dr Marissa Wilson

## 2020-10-05 ENCOUNTER — OFFICE VISIT (OUTPATIENT)
Dept: CARDIOLOGY CLINIC | Age: 65
End: 2020-10-05
Payer: MEDICARE

## 2020-10-05 DIAGNOSIS — Z95.0 CARDIAC PACEMAKER IN SITU: Primary | ICD-10-CM

## 2020-10-05 DIAGNOSIS — I49.5 SSS (SICK SINUS SYNDROME) (HCC): ICD-10-CM

## 2020-10-05 PROCEDURE — 93294 REM INTERROG EVL PM/LDLS PM: CPT | Performed by: INTERNAL MEDICINE

## 2021-01-11 ENCOUNTER — OFFICE VISIT (OUTPATIENT)
Dept: CARDIOLOGY CLINIC | Age: 66
End: 2021-01-11
Payer: MEDICARE

## 2021-01-11 DIAGNOSIS — I49.5 SSS (SICK SINUS SYNDROME) (HCC): ICD-10-CM

## 2021-01-11 DIAGNOSIS — Z95.0 CARDIAC PACEMAKER IN SITU: Primary | ICD-10-CM

## 2021-01-11 PROCEDURE — 93294 REM INTERROG EVL PM/LDLS PM: CPT | Performed by: INTERNAL MEDICINE

## 2021-04-19 ENCOUNTER — OFFICE VISIT (OUTPATIENT)
Dept: CARDIOLOGY CLINIC | Age: 66
End: 2021-04-19
Payer: MEDICARE

## 2021-04-19 DIAGNOSIS — I49.5 SSS (SICK SINUS SYNDROME) (HCC): ICD-10-CM

## 2021-04-19 DIAGNOSIS — Z95.0 CARDIAC PACEMAKER IN SITU: Primary | ICD-10-CM

## 2021-04-19 PROCEDURE — 93294 REM INTERROG EVL PM/LDLS PM: CPT | Performed by: INTERNAL MEDICINE

## 2021-07-01 ENCOUNTER — OFFICE VISIT (OUTPATIENT)
Dept: CARDIOLOGY CLINIC | Age: 66
End: 2021-07-01
Payer: MEDICARE

## 2021-07-01 ENCOUNTER — CLINICAL SUPPORT (OUTPATIENT)
Dept: CARDIOLOGY CLINIC | Age: 66
End: 2021-07-01
Payer: MEDICARE

## 2021-07-01 VITALS
SYSTOLIC BLOOD PRESSURE: 116 MMHG | HEART RATE: 63 BPM | BODY MASS INDEX: 30.59 KG/M2 | DIASTOLIC BLOOD PRESSURE: 72 MMHG | OXYGEN SATURATION: 96 % | RESPIRATION RATE: 18 BRPM | HEIGHT: 75 IN | WEIGHT: 246 LBS

## 2021-07-01 DIAGNOSIS — Z95.0 CARDIAC PACEMAKER IN SITU: Primary | ICD-10-CM

## 2021-07-01 DIAGNOSIS — I48.3 TYPICAL ATRIAL FLUTTER (HCC): ICD-10-CM

## 2021-07-01 DIAGNOSIS — R00.1 BRADYCARDIA: ICD-10-CM

## 2021-07-01 DIAGNOSIS — I49.5 SSS (SICK SINUS SYNDROME) (HCC): ICD-10-CM

## 2021-07-01 DIAGNOSIS — Z95.0 CARDIAC PACEMAKER IN SITU: ICD-10-CM

## 2021-07-01 DIAGNOSIS — I49.5 SSS (SICK SINUS SYNDROME) (HCC): Primary | ICD-10-CM

## 2021-07-01 DIAGNOSIS — I48.0 PAROXYSMAL ATRIAL FIBRILLATION (HCC): ICD-10-CM

## 2021-07-01 PROCEDURE — G8427 DOCREV CUR MEDS BY ELIG CLIN: HCPCS | Performed by: NURSE PRACTITIONER

## 2021-07-01 PROCEDURE — 93280 PM DEVICE PROGR EVAL DUAL: CPT | Performed by: INTERNAL MEDICINE

## 2021-07-01 PROCEDURE — 99214 OFFICE O/P EST MOD 30 MIN: CPT | Performed by: NURSE PRACTITIONER

## 2021-07-01 PROCEDURE — 93005 ELECTROCARDIOGRAM TRACING: CPT | Performed by: NURSE PRACTITIONER

## 2021-07-01 PROCEDURE — 1101F PT FALLS ASSESS-DOCD LE1/YR: CPT | Performed by: NURSE PRACTITIONER

## 2021-07-01 PROCEDURE — G8417 CALC BMI ABV UP PARAM F/U: HCPCS | Performed by: NURSE PRACTITIONER

## 2021-07-01 PROCEDURE — 93010 ELECTROCARDIOGRAM REPORT: CPT | Performed by: NURSE PRACTITIONER

## 2021-07-01 PROCEDURE — G8432 DEP SCR NOT DOC, RNG: HCPCS | Performed by: NURSE PRACTITIONER

## 2021-07-01 PROCEDURE — G8752 SYS BP LESS 140: HCPCS | Performed by: NURSE PRACTITIONER

## 2021-07-01 PROCEDURE — G0463 HOSPITAL OUTPT CLINIC VISIT: HCPCS | Performed by: NURSE PRACTITIONER

## 2021-07-01 PROCEDURE — G8536 NO DOC ELDER MAL SCRN: HCPCS | Performed by: NURSE PRACTITIONER

## 2021-07-01 PROCEDURE — G8754 DIAS BP LESS 90: HCPCS | Performed by: NURSE PRACTITIONER

## 2021-07-01 PROCEDURE — 3017F COLORECTAL CA SCREEN DOC REV: CPT | Performed by: NURSE PRACTITIONER

## 2021-07-01 NOTE — PROGRESS NOTES
Chief Complaint   Patient presents with    Pacemaker Check     Annual follow up - denies cardiac sx      1. Have you been to the ER, urgent care clinic since your last visit? Hospitalized since your last visit? No     2. Have you seen or consulted any other health care providers outside of the [address] [place] [address] since your last visit? Include any pap smears or colon screening.   Yes PCP

## 2021-07-01 NOTE — PROGRESS NOTES
ELECTROPHYSIOLOGY        Subjective:      Nay Benson is a 78 y.o. male is here for EP follow up. Is without cardiac complaints. The patient denies chest pain/ shortness of breath, orthopnea, PND, LE edema, palpitations, syncope, presyncope or fatigue. Is wearing a boot for achilles strain. Patient Active Problem List    Diagnosis Date Noted    Irregular heartbeat 12/26/2017    S/P ablation of atrial fibrillation 05/02/2016    Paroxysmal atrial fibrillation ([address] 04/19/2016    SSS (sick sinus syndrome) ([address] 03/15/2016    Thrombocytopenia ([address] 01/05/2015    Atrial flutter ([address] 01/02/2015    Pneumonia 01/02/2015    Hypotension 01/02/2015    Hypertension 01/02/2015    Hyperlipidemia 01/02/2015    Atrial flutter with rapid ventricular response ([address] 01/02/2015      Malina Issa MD  Past Medical History:   Diagnosis Date    Arrhythmia     atrial flutter/atrial fibrillation    Arthritis     gout    Hypertension     Ill-defined condition     hypercholesterolemia    S/P ablation of atrial fibrillation 5/2/2016      Past Surgical History:   Procedure Laterality Date    HX GI  1997    anal fissure repair     No Known Allergies   Family History   Problem Relation Age of Onset    Other Mother         liver cirrhosis    Diabetes Father     Heart Disease Brother     negative for cardiac disease  Social History     Socioeconomic History    Marital status:      Spouse name: Not on file    Number of children: Not on file    Years of education: Not on file    Highest education level: Not on file   Tobacco Use    Smoking status: Never Smoker    Smokeless tobacco: Never Used   Substance and Sexual Activity    Alcohol use:  Yes     Alcohol/week: 0.8 standard drinks     Types: 1 Cans of beer per week     Comment: every month    Drug use: No    Sexual activity: Yes     Social Determinants of Health     Financial Resource Strain:     Difficulty of Paying Living Expenses: Food Insecurity:     Worried About Running Out of Food in the Last Year:     [address] in the Last Year:    Transportation Needs:     Lack of Transportation (Medical):  Lack of Transportation (Non-Medical):    Physical Activity:     Days of Exercise per Week:     Minutes of Exercise per Session:    Stress:     Feeling of Stress :    Social Connections:     Frequency of Communication with Friends and Family:     Frequency of Social Gatherings with Friends and Family:     Attends Nondenominational Services:     Active Member of Clubs or Organizations:     Attends Club or Organization Meetings:     Marital Status:      Current Outpatient Medications   Medication Sig    potassium chloride (K-DUR, KLOR-CON) 20 mEq tablet Take 1 Tab by mouth daily.  atenolol (TENORMIN) 25 mg tablet TAKE 1 TABLET BY MOUTH EVERY DAY    sildenafil citrate (VIAGRA) 100 mg tablet Take 100 mg by mouth as needed.  quinapril (ACCUPRIL) 40 mg tablet Take 40 mg by mouth daily.  zolpidem (AMBIEN) 10 mg tablet Take 10 mg by mouth nightly as needed.  hydrochlorothiazide (HYDRODIURIL) 25 mg tablet Take 1 tablet by mouth daily.  allopurinol (ZYLOPRIM) 300 mg tablet Take 300 mg by mouth daily.  atorvastatin (LIPITOR) 20 mg tablet Take 20 mg by mouth daily.  aspirin delayed-release 81 mg tablet Take  by mouth daily. (Patient not taking: Reported on 7/1/2021)     No current facility-administered medications for this visit. Vitals:    07/01/21 0946   BP: 116/72   Pulse: 63   Resp: 18   SpO2: 96%   Weight: 246 lb (111.6 kg)   Height: 6' 3\" (1.905 m)       I have reviewed the nurses notes, vitals, problem list, allergy list, medical history, family, social history and medications. Review of Symptoms:    General: Pt denies excessive weight gain or loss. Pt is able to conduct ADL's  HEENT: Denies blurred vision, headaches, epistaxis and difficulty swallowing.   Respiratory: Denies shortness of breath, GALEANO, wheezing or stridor. Cardiovascular: Denies precordial pain, palpitations, edema or PND  Gastrointestinal: Denies poor appetite, indigestion, abdominal pain or blood in stool  Urinary: Denies dysuria, pyuria  Musculoskeletal: Denies pain or swelling from muscles or joints  Neurologic: Denies tremor, paresthesias, or sensory motor disturbance  Skin: Denies rash, itching or texture change. Psych: Denies depression      Physical Exam:      General: Well developed, in no acute distress. HEENT: Eyes - PERRL  Heart:  Normal S1/S2 negative S3 or S4. Regular, no murmur  Respiratory: Clear bilaterally x 4, no wheezing or rales  Extremities:  No edema, no cyanosis. Musculoskeletal: No clubbing  Neuro: A&Ox3, speech clear  Skin: No visible rashes or lesions. Non diaphoretic. No visible ulcers  Vascular: 2+ pulses symmetric in all extremities  Psych - judgement intact and orientation is wnl       Cardiographics    Ek21  A paced    Results for orders placed or performed during the hospital encounter of 16   EKG, 12 LEAD, INITIAL   Result Value Ref Range    Ventricular Rate 90 BPM    Atrial Rate 90 BPM    P-R Interval 232 ms    QRS Duration 120 ms    Q-T Interval 376 ms    QTC Calculation (Bezet) 459 ms    Calculated P Axis 43 degrees    Calculated R Axis -54 degrees    Calculated T Axis 27 degrees    Diagnosis       Sinus rhythm with 1st degree AV block  Left axis deviation  Septal infarct (cited on or before 2015)  Inferior infarct , age undetermined  When compared with ECG of 2015 03:09,  Vent.  rate has increased BY  37 BPM  QRS axis shifted left  Inferior infarct is now present    Confirmed by Ricardo Malone ([id number]) on 2016 8:47:27 AM           Lab Results   Component Value Date/Time    WBC 7.4 2018 03:01 PM    HGB 16.0 2018 03:01 PM    HCT 45.5 2018 03:01 PM    PLATELET 754  03:01 PM    MCV 93 2018 03:01 PM      Lab Results   Component Value Date/Time Sodium 142 04/02/2018 08:14 AM    Potassium 4.4 04/02/2018 08:14 AM    Chloride 99 04/02/2018 08:14 AM    CO2 28 04/02/2018 08:14 AM    Anion gap 4 (L) 05/03/2016 06:29 AM    Glucose 109 (H) 04/02/2018 08:14 AM    BUN 26 04/02/2018 08:14 AM    Creatinine 0.98 04/02/2018 08:14 AM    BUN/Creatinine ratio 27 (H) 04/02/2018 08:14 AM    GFR est AA 94 04/02/2018 08:14 AM    GFR est non-AA 81 04/02/2018 08:14 AM    Calcium 9.6 04/02/2018 08:14 AM    Bilirubin, total 0.6 04/02/2018 08:14 AM    Alk. phosphatase 66 04/02/2018 08:14 AM    Protein, total 6.6 04/02/2018 08:14 AM    Albumin 4.3 04/02/2018 08:14 AM    Globulin 3.7 01/02/2015 05:40 AM    A-G Ratio 1.9 04/02/2018 08:14 AM    ALT (SGPT) 21 04/02/2018 08:14 AM      Lab Results   Component Value Date/Time    TSH 2.44 01/02/2015 05:40 AM           Assessment:           ICD-10-CM ICD-9-CM    1. SSS (sick sinus syndrome) (HCC)  I49.5 427.81    2. Paroxysmal atrial fibrillation (HCC)  I48.0 427.31 AMB POC EKG ROUTINE W/ 12 LEADS, INTER & REP   3. Typical atrial flutter (HCC)  I48.3 427.32    4. Bradycardia  R00.1 427.89    5. Cardiac pacemaker in situ  Z95.0 V45.01      Orders Placed This Encounter    AMB POC EKG ROUTINE W/ 12 LEADS, INTER & REP     Order Specific Question:   Reason for Exam:     Answer:   routine        Plan:     Mr. Tona Mujica is here for annual follow up s/p pacemaker implantation and ILR removal (4/10/18), PVI 5/2/18. He is doing well and denies cardiac complaints. His device interrogation demonstrates normal functioning (98.1% AP, 0.1% RVP). His device has > 7 years battery remaining. EKG shows sinus rhythm. He is normotensive. During this visit,  the patient and I had a comprehensive discussion of device management using principles of shared decision making. we reviewed device therapy, including the potential risks and benefits of device management.  These risks include death, myocardial infarction, stroke, cardiac perforation, vascular injury, injury, pacing induced cardiomyopathy, inappropriate shocks (defibrillator) and other less severe complications. The patient demonstrated a clear understanding of these issues during out discussion. Our plans, determined together after thorough consideration, are outlined else where in this note. Enrolled in remote monitoring and I will see him back in 1 year. Addressed all patient questions and concerns at this visit. Continue medical management for HTN. Discussed side effects, efficacy and good medication compliance with pt re: bb,ace and HCTZ. R     Thank you for allowing me to participate in Shahab Coelho 's care. Holland Barros NP          Patient was made aware during visit today that all testing completed would be instantaneously available on their MyChart for review. Discussed that these results will be made available to the provider at the same time. They were advised to wait at least 3 business days to allow for provider's interpretation of results with follow-up before calling our office with concerns about their results.

## 2021-10-04 ENCOUNTER — OFFICE VISIT (OUTPATIENT)
Dept: CARDIOLOGY CLINIC | Age: 66
End: 2021-10-04
Payer: MEDICARE

## 2021-10-04 DIAGNOSIS — Z95.0 CARDIAC PACEMAKER IN SITU: Primary | ICD-10-CM

## 2021-10-04 DIAGNOSIS — I49.5 SSS (SICK SINUS SYNDROME) (HCC): ICD-10-CM

## 2021-10-04 PROCEDURE — 93296 REM INTERROG EVL PM/IDS: CPT | Performed by: INTERNAL MEDICINE

## 2021-10-04 PROCEDURE — 93294 REM INTERROG EVL PM/LDLS PM: CPT | Performed by: INTERNAL MEDICINE

## 2022-01-10 ENCOUNTER — OFFICE VISIT (OUTPATIENT)
Dept: CARDIOLOGY CLINIC | Age: 67
End: 2022-01-10
Payer: MEDICARE

## 2022-01-10 DIAGNOSIS — I49.5 SSS (SICK SINUS SYNDROME) (HCC): ICD-10-CM

## 2022-01-10 DIAGNOSIS — I48.0 PAROXYSMAL ATRIAL FIBRILLATION (HCC): ICD-10-CM

## 2022-01-10 DIAGNOSIS — Z95.0 CARDIAC PACEMAKER IN SITU: Primary | ICD-10-CM

## 2022-01-10 PROCEDURE — 93294 REM INTERROG EVL PM/LDLS PM: CPT | Performed by: INTERNAL MEDICINE

## 2022-01-10 PROCEDURE — 93296 REM INTERROG EVL PM/IDS: CPT | Performed by: INTERNAL MEDICINE

## 2022-03-18 PROBLEM — I49.9 IRREGULAR HEARTBEAT: Status: ACTIVE | Noted: 2017-12-26

## 2022-04-18 ENCOUNTER — OFFICE VISIT (OUTPATIENT)
Dept: CARDIOLOGY CLINIC | Age: 67
End: 2022-04-18
Payer: MEDICARE

## 2022-04-18 DIAGNOSIS — I49.5 SSS (SICK SINUS SYNDROME) (HCC): ICD-10-CM

## 2022-04-18 DIAGNOSIS — Z95.0 CARDIAC PACEMAKER IN SITU: Primary | ICD-10-CM

## 2022-04-18 DIAGNOSIS — I48.0 PAROXYSMAL ATRIAL FIBRILLATION (HCC): ICD-10-CM

## 2022-04-18 PROCEDURE — 93294 REM INTERROG EVL PM/LDLS PM: CPT | Performed by: INTERNAL MEDICINE

## 2022-04-18 PROCEDURE — 93296 REM INTERROG EVL PM/IDS: CPT | Performed by: INTERNAL MEDICINE

## 2025-02-21 ENCOUNTER — HOSPITAL ENCOUNTER (EMERGENCY)
Facility: HOSPITAL | Age: 70
Discharge: HOME | End: 2025-02-21
Payer: MEDICARE

## 2025-02-21 ENCOUNTER — APPOINTMENT (OUTPATIENT)
Facility: HOSPITAL | Age: 70
End: 2025-02-21
Payer: MEDICARE

## 2025-02-21 VITALS
WEIGHT: 240 LBS | SYSTOLIC BLOOD PRESSURE: 133 MMHG | TEMPERATURE: 97.5 F | OXYGEN SATURATION: 98 % | HEIGHT: 75 IN | HEART RATE: 64 BPM | RESPIRATION RATE: 14 BRPM | BODY MASS INDEX: 29.84 KG/M2 | DIASTOLIC BLOOD PRESSURE: 86 MMHG

## 2025-02-21 DIAGNOSIS — N20.0 KIDNEY STONE ON LEFT SIDE: Primary | ICD-10-CM

## 2025-02-21 LAB
ALBUMIN SERPL-MCNC: 3.8 G/DL (ref 3.5–5)
ALBUMIN/GLOB SERPL: 1.2 (ref 1.1–2.2)
ALP SERPL-CCNC: 86 U/L (ref 45–117)
ALT SERPL-CCNC: 42 U/L (ref 12–78)
ANION GAP SERPL CALC-SCNC: 5 MMOL/L (ref 2–12)
APPEARANCE UR: CLEAR
AST SERPL-CCNC: 28 U/L (ref 15–37)
BACTERIA URNS QL MICRO: NEGATIVE /HPF
BASOPHILS # BLD: 0.03 K/UL (ref 0–0.1)
BASOPHILS NFR BLD: 0.3 % (ref 0–1)
BILIRUB SERPL-MCNC: 1.5 MG/DL (ref 0.2–1)
BILIRUB UR QL: NEGATIVE
BUN SERPL-MCNC: 21 MG/DL (ref 6–20)
BUN/CREAT SERPL: 15 (ref 12–20)
CALCIUM SERPL-MCNC: 9.2 MG/DL (ref 8.5–10.1)
CHLORIDE SERPL-SCNC: 102 MMOL/L (ref 97–108)
CO2 SERPL-SCNC: 29 MMOL/L (ref 21–32)
COLOR UR: ABNORMAL
CREAT SERPL-MCNC: 1.44 MG/DL (ref 0.7–1.3)
DIFFERENTIAL METHOD BLD: ABNORMAL
EOSINOPHIL # BLD: 0.02 K/UL (ref 0–0.4)
EOSINOPHIL NFR BLD: 0.2 % (ref 0–7)
EPITH CASTS URNS QL MICRO: ABNORMAL /LPF
ERYTHROCYTE [DISTWIDTH] IN BLOOD BY AUTOMATED COUNT: 13.2 % (ref 11.5–14.5)
GLOBULIN SER CALC-MCNC: 3.3 G/DL (ref 2–4)
GLUCOSE SERPL-MCNC: 169 MG/DL (ref 65–100)
GLUCOSE UR STRIP.AUTO-MCNC: NEGATIVE MG/DL
HCT VFR BLD AUTO: 48.7 % (ref 36.6–50.3)
HGB BLD-MCNC: 16.6 G/DL (ref 12.1–17)
HGB UR QL STRIP: ABNORMAL
HYALINE CASTS URNS QL MICRO: ABNORMAL /LPF (ref 0–2)
IMM GRANULOCYTES # BLD AUTO: 0.04 K/UL (ref 0–0.04)
IMM GRANULOCYTES NFR BLD AUTO: 0.3 % (ref 0–0.5)
KETONES UR QL STRIP.AUTO: NEGATIVE MG/DL
LEUKOCYTE ESTERASE UR QL STRIP.AUTO: ABNORMAL
LIPASE SERPL-CCNC: 61 U/L (ref 13–75)
LYMPHOCYTES # BLD: 1.28 K/UL (ref 0.8–3.5)
LYMPHOCYTES NFR BLD: 10.9 % (ref 12–49)
MCH RBC QN AUTO: 31.4 PG (ref 26–34)
MCHC RBC AUTO-ENTMCNC: 34.1 G/DL (ref 30–36.5)
MCV RBC AUTO: 92.2 FL (ref 80–99)
MONOCYTES # BLD: 0.71 K/UL (ref 0–1)
MONOCYTES NFR BLD: 6 % (ref 5–13)
NEUTS SEG # BLD: 9.67 K/UL (ref 1.8–8)
NEUTS SEG NFR BLD: 82.3 % (ref 32–75)
NITRITE UR QL STRIP.AUTO: NEGATIVE
NRBC # BLD: 0 K/UL (ref 0–0.01)
NRBC BLD-RTO: 0 PER 100 WBC
PH UR STRIP: 7.5 (ref 5–8)
PLATELET # BLD AUTO: 170 K/UL (ref 150–400)
PMV BLD AUTO: 9.5 FL (ref 8.9–12.9)
POTASSIUM SERPL-SCNC: 3.8 MMOL/L (ref 3.5–5.1)
PROT SERPL-MCNC: 7.1 G/DL (ref 6.4–8.2)
PROT UR STRIP-MCNC: NEGATIVE MG/DL
RBC # BLD AUTO: 5.28 M/UL (ref 4.1–5.7)
RBC #/AREA URNS HPF: ABNORMAL /HPF (ref 0–5)
SODIUM SERPL-SCNC: 136 MMOL/L (ref 136–145)
SP GR UR REFRACTOMETRY: 1.02
URINE CULTURE IF INDICATED: ABNORMAL
UROBILINOGEN UR QL STRIP.AUTO: 1 EU/DL (ref 0.2–1)
WBC # BLD AUTO: 11.8 K/UL (ref 4.1–11.1)
WBC URNS QL MICRO: ABNORMAL /HPF (ref 0–4)

## 2025-02-21 PROCEDURE — 6360000004 HC RX CONTRAST MEDICATION: Performed by: PHYSICIAN ASSISTANT

## 2025-02-21 PROCEDURE — 6360000002 HC RX W HCPCS: Performed by: PHYSICIAN ASSISTANT

## 2025-02-21 PROCEDURE — 83690 ASSAY OF LIPASE: CPT

## 2025-02-21 PROCEDURE — 99285 EMERGENCY DEPT VISIT HI MDM: CPT

## 2025-02-21 PROCEDURE — 85025 COMPLETE CBC W/AUTO DIFF WBC: CPT

## 2025-02-21 PROCEDURE — 2580000003 HC RX 258: Performed by: PHYSICIAN ASSISTANT

## 2025-02-21 PROCEDURE — 96374 THER/PROPH/DIAG INJ IV PUSH: CPT

## 2025-02-21 PROCEDURE — 74177 CT ABD & PELVIS W/CONTRAST: CPT

## 2025-02-21 PROCEDURE — 81001 URINALYSIS AUTO W/SCOPE: CPT

## 2025-02-21 PROCEDURE — 80053 COMPREHEN METABOLIC PANEL: CPT

## 2025-02-21 PROCEDURE — 36415 COLL VENOUS BLD VENIPUNCTURE: CPT

## 2025-02-21 RX ORDER — OXYCODONE HYDROCHLORIDE 5 MG/1
5 TABLET ORAL EVERY 6 HOURS PRN
Qty: 12 TABLET | Refills: 0 | Status: SHIPPED | OUTPATIENT
Start: 2025-02-21 | End: 2025-02-24

## 2025-02-21 RX ORDER — KETOROLAC TROMETHAMINE 30 MG/ML
15 INJECTION, SOLUTION INTRAMUSCULAR; INTRAVENOUS
Status: COMPLETED | OUTPATIENT
Start: 2025-02-21 | End: 2025-02-21

## 2025-02-21 RX ORDER — IOPAMIDOL 755 MG/ML
100 INJECTION, SOLUTION INTRAVASCULAR
Status: COMPLETED | OUTPATIENT
Start: 2025-02-21 | End: 2025-02-21

## 2025-02-21 RX ORDER — TAMSULOSIN HYDROCHLORIDE 0.4 MG/1
0.4 CAPSULE ORAL DAILY
Qty: 7 CAPSULE | Refills: 0 | Status: SHIPPED | OUTPATIENT
Start: 2025-02-21 | End: 2025-02-28

## 2025-02-21 RX ORDER — ONDANSETRON 4 MG/1
4 TABLET, ORALLY DISINTEGRATING ORAL 3 TIMES DAILY PRN
Qty: 21 TABLET | Refills: 0 | Status: SHIPPED | OUTPATIENT
Start: 2025-02-21

## 2025-02-21 RX ORDER — 0.9 % SODIUM CHLORIDE 0.9 %
1000 INTRAVENOUS SOLUTION INTRAVENOUS ONCE
Status: COMPLETED | OUTPATIENT
Start: 2025-02-21 | End: 2025-02-21

## 2025-02-21 RX ADMIN — IOPAMIDOL 100 ML: 755 INJECTION, SOLUTION INTRAVENOUS at 21:41

## 2025-02-21 RX ADMIN — SODIUM CHLORIDE 1000 ML: 0.9 INJECTION, SOLUTION INTRAVENOUS at 20:57

## 2025-02-21 RX ADMIN — KETOROLAC TROMETHAMINE 15 MG: 30 INJECTION, SOLUTION INTRAMUSCULAR at 20:58

## 2025-02-21 ASSESSMENT — PAIN DESCRIPTION - DESCRIPTORS
DESCRIPTORS: ACHING
DESCRIPTORS: ACHING

## 2025-02-21 ASSESSMENT — PAIN SCALES - GENERAL
PAINLEVEL_OUTOF10: 3
PAINLEVEL_OUTOF10: 1

## 2025-02-21 ASSESSMENT — PAIN DESCRIPTION - PAIN TYPE: TYPE: ACUTE PAIN

## 2025-02-21 ASSESSMENT — PAIN DESCRIPTION - ORIENTATION
ORIENTATION: LOWER
ORIENTATION: LEFT;MID;LOWER

## 2025-02-21 ASSESSMENT — PAIN DESCRIPTION - LOCATION
LOCATION: ABDOMEN

## 2025-02-21 ASSESSMENT — PAIN - FUNCTIONAL ASSESSMENT: PAIN_FUNCTIONAL_ASSESSMENT: 0-10

## 2025-02-22 NOTE — ED PROVIDER NOTES
[facility] EMERGENCY DEPARTMENT  EMERGENCY DEPARTMENT ENCOUNTER       Pt Name: Miller Ham  MRN: [id number]  Birthdate 1955  Date of evaluation: 2/21/2025  Provider: QUANG Kinney   PCP: INÉS Schultz MD  Note Started: 8:49 PM EST 2/21/25     CHIEF COMPLAINT       Chief Complaint   Patient presents with    Abdominal Pain     Pt ambulatory into ED. Pt states his abd pain began yesterday. Pt points to LLQ, and also nausea with constipation . Pt gave himself an enema. Pt LBM 2 days ago. Pt appears well in TR. Pt denies CP, SOB, rectal bleeding, or dysuria.       HISTORY OF PRESENT ILLNESS: 1 or more elements      History From: Patient  HPI Limitations: None     Miller Ham is a 70 y.o. male who presents by POV for evaluation of LLQ abdominal pain. The pain started about 3 days ago. It is mild, nagging pain that has progressively worsened since onset. He started taking Tylenol today, which relief of pain temporarily. He denies history of similar pain but notes history of kidney stones. He has associated complaints of chills without fever and denies nausea, vomiting, diarrhea, constipation, urinary complaint, chest pain, palpitations, and shortness of breath.  He also reports using several stool softeners and enemas without relief.     Nursing Notes were all reviewed and agreed with or any disagreements were addressed in the HPI.     REVIEW OF SYSTEMS      Review of Systems     Positives and Pertinent negatives as per HPI.    PAST HISTORY     Past Medical History:  Past Medical History:   Diagnosis Date    Arrhythmia     atrial flutter/atrial fibrillation    Arthritis     gout    Hypertension     Ill-defined condition     hypercholesterolemia    S/P ablation of atrial fibrillation 5/2/2016       Past Surgical History:  Past Surgical History:   Procedure Laterality Date    GI  1997    anal fissure repair       Family History:  Family History   Problem Relation Age of Onset    Other Mother

## 2025-02-22 NOTE — DISCHARGE INSTRUCTIONS
Thank You!    It was a pleasure taking care of you in our Emergency Department today. We know that when you come to our Emergency Department, you are entrusting us with your health, comfort, and safety. Our physicians and nurses honor that trust, and truly appreciate the opportunity to care for you and your loved ones.      We also value your feedback. If you receive a survey about your Emergency Department experience today, please fill it out.  We care about our patients' feedback, and we listen to what you have to say.  Thank you.    QUANG Kinney  ________________________________________________________________________  I have included a copy of your lab results and/or radiologic studies from today's visit so you can have them easily available at your follow-up visit. We hope you feel better and please do not hesitate to contact the ED if you have any questions at all!    Recent Results (from the past 12 hour(s))   CBC with Auto Differential    Collection Time: 02/21/25  8:36 PM   Result Value Ref Range    WBC 11.8 (H) 4.1 - 11.1 K/uL    RBC 5.28 4.10 - 5.70 M/uL    Hemoglobin 16.6 12.1 - 17.0 g/dL    Hematocrit 48.7 36.6 - 50.3 %    MCV 92.2 80.0 - 99.0 FL    MCH 31.4 26.0 - 34.0 PG    MCHC 34.1 30.0 - 36.5 g/dL    RDW 13.2 11.5 - 14.5 %    Platelets 170 150 - 400 K/uL    MPV 9.5 8.9 - 12.9 FL    Nucleated RBCs 0.0 0  WBC    nRBC 0.00 0.00 - 0.01 K/uL    Neutrophils % 82.3 (H) 32.0 - 75.0 %    Lymphocytes % 10.9 (L) 12.0 - 49.0 %    Monocytes % 6.0 5.0 - 13.0 %    Eosinophils % 0.2 0.0 - 7.0 %    Basophils % 0.3 0.0 - 1.0 %    Immature Granulocytes % 0.3 0.0 - 0.5 %    Neutrophils Absolute 9.67 (H) 1.80 - 8.00 K/UL    Lymphocytes Absolute 1.28 0.80 - 3.50 K/UL    Monocytes Absolute 0.71 0.00 - 1.00 K/UL    Eosinophils Absolute 0.02 0.00 - 0.40 K/UL    Basophils Absolute 0.03 0.00 - 0.10 K/UL    Immature Granulocytes Absolute 0.04 0.00 - 0.04 K/UL    Differential Type AUTOMATED     Comprehensive

## 2025-07-01 ENCOUNTER — TRANSCRIBE ORDERS (OUTPATIENT)
Facility: HOSPITAL | Age: 70
End: 2025-07-01

## 2025-07-01 DIAGNOSIS — R97.20 ELEVATED PROSTATE SPECIFIC ANTIGEN (PSA): Primary | ICD-10-CM

## 2025-08-25 ENCOUNTER — HOSPITAL ENCOUNTER (OUTPATIENT)
Facility: HOSPITAL | Age: 70
Discharge: HOME | End: 2025-08-28
Attending: UROLOGY
Payer: MEDICARE

## 2025-08-25 DIAGNOSIS — R97.20 ELEVATED PROSTATE SPECIFIC ANTIGEN (PSA): ICD-10-CM

## 2025-08-25 PROCEDURE — 76014 MR SFTY IMPLT&/FB ASMT STF 1: CPT

## 2025-08-25 PROCEDURE — 72197 MRI PELVIS W/O & W/DYE: CPT

## 2025-08-25 PROCEDURE — A9579 GAD-BASE MR CONTRAST NOS,1ML: HCPCS | Performed by: UROLOGY

## 2025-08-25 PROCEDURE — 76018 MR SAFETY IMPLANT ELEC PREPJ: CPT

## 2025-08-25 PROCEDURE — 6360000004 HC RX CONTRAST MEDICATION: Performed by: UROLOGY

## 2025-08-25 RX ORDER — GADOTERIDOL 279.3 MG/ML
20 INJECTION INTRAVENOUS
Status: COMPLETED | OUTPATIENT
Start: 2025-08-25 | End: 2025-08-25

## 2025-08-25 RX ADMIN — GADOTERIDOL 20 ML: 279.3 INJECTION, SOLUTION INTRAVENOUS at 14:15
